# Patient Record
Sex: FEMALE | Race: WHITE | Employment: PART TIME | URBAN - METROPOLITAN AREA
[De-identification: names, ages, dates, MRNs, and addresses within clinical notes are randomized per-mention and may not be internally consistent; named-entity substitution may affect disease eponyms.]

---

## 2017-08-24 PROBLEM — N60.99 BENIGN BREAST DISEASE: Status: ACTIVE | Noted: 2017-08-24

## 2017-08-24 PROBLEM — Z80.0 FAMILY HISTORY OF COLON CANCER IN FATHER: Status: ACTIVE | Noted: 2017-08-24

## 2019-02-01 PROBLEM — IMO0002 CRPS (COMPLEX REGIONAL PAIN SYNDROME): Status: RESOLVED | Noted: 2019-02-01 | Resolved: 2019-02-01

## 2019-02-01 PROBLEM — G47.01 INSOMNIA DUE TO MEDICAL CONDITION: Status: ACTIVE | Noted: 2019-02-01

## 2021-08-30 ENCOUNTER — HOSPITAL ENCOUNTER (OUTPATIENT)
Dept: LAB | Facility: HOSPITAL | Age: 66
Discharge: HOME | End: 2021-08-30
Attending: THORACIC SURGERY (CARDIOTHORACIC VASCULAR SURGERY)
Payer: COMMERCIAL

## 2021-08-30 ENCOUNTER — HOSPITAL ENCOUNTER (OUTPATIENT)
Dept: CARDIOLOGY CLINIC | Facility: HOSPITAL | Age: 66
Discharge: HOME | End: 2021-08-30
Attending: THORACIC SURGERY (CARDIOTHORACIC VASCULAR SURGERY)
Payer: COMMERCIAL

## 2021-08-30 ENCOUNTER — HOSPITAL ENCOUNTER (OUTPATIENT)
Dept: GENERAL RADIOLOGY | Facility: HOSPITAL | Age: 66
Discharge: HOME | End: 2021-08-30
Attending: THORACIC SURGERY (CARDIOTHORACIC VASCULAR SURGERY)
Payer: COMMERCIAL

## 2021-08-30 ENCOUNTER — HOSPITAL ENCOUNTER (OUTPATIENT)
Dept: INTERVENTIONAL RADIOLOGY/VASCULAR | Facility: HOSPITAL | Age: 66
Discharge: HOME | End: 2021-08-30
Attending: THORACIC SURGERY (CARDIOTHORACIC VASCULAR SURGERY)
Payer: COMMERCIAL

## 2021-08-30 ENCOUNTER — HOSPITAL ENCOUNTER (OUTPATIENT)
Dept: CV DIAGNOSTICS | Facility: HOSPITAL | Age: 66
Discharge: HOME | End: 2021-08-30
Attending: THORACIC SURGERY (CARDIOTHORACIC VASCULAR SURGERY)
Payer: COMMERCIAL

## 2021-08-30 DIAGNOSIS — Z01.818 PREOP TESTING: ICD-10-CM

## 2021-08-30 DIAGNOSIS — I25.10 CAD (CORONARY ARTERY DISEASE): ICD-10-CM

## 2021-08-30 DIAGNOSIS — Z91.89 AT RISK FOR BLEEDING: ICD-10-CM

## 2021-08-30 LAB
ALBUMIN SERPL-MCNC: 3.7 G/DL (ref 3.4–5)
ALBUMIN/GLOB SERPL: 1 {RATIO} (ref 1–2)
ALP LIVER SERPL-CCNC: 82 U/L
ALT SERPL-CCNC: 26 U/L
ANION GAP SERPL CALC-SCNC: 3 MMOL/L (ref 0–18)
ANTIBODY SCREEN: NEGATIVE
APTT PPP: 30.3 SECONDS (ref 25.4–36.1)
AST SERPL-CCNC: 23 U/L (ref 15–37)
ATRIAL RATE: 77 BPM
BASOPHILS # BLD AUTO: 0.03 X10(3) UL (ref 0–0.2)
BASOPHILS NFR BLD AUTO: 0.4 %
BILIRUB SERPL-MCNC: 0.4 MG/DL (ref 0.1–2)
BILIRUB UR QL STRIP.AUTO: NEGATIVE
BUN BLD-MCNC: 19 MG/DL (ref 7–18)
CALCIUM BLD-MCNC: 9.8 MG/DL (ref 8.5–10.1)
CHLORIDE SERPL-SCNC: 109 MMOL/L (ref 98–112)
CLARITY UR REFRACT.AUTO: CLEAR
CO2 SERPL-SCNC: 28 MMOL/L (ref 21–32)
COLOR UR AUTO: YELLOW
CREAT BLD-MCNC: 0.88 MG/DL
EOSINOPHIL # BLD AUTO: 0.07 X10(3) UL (ref 0–0.7)
EOSINOPHIL NFR BLD AUTO: 1 %
ERYTHROCYTE [DISTWIDTH] IN BLOOD BY AUTOMATED COUNT: 12 %
EST. AVERAGE GLUCOSE BLD GHB EST-MCNC: 108 MG/DL (ref 68–126)
GLOBULIN PLAS-MCNC: 3.7 G/DL (ref 2.8–4.4)
GLUCOSE BLD-MCNC: 93 MG/DL (ref 70–99)
GLUCOSE UR STRIP.AUTO-MCNC: NEGATIVE MG/DL
HBA1C MFR BLD HPLC: 5.4 % (ref ?–5.7)
HCT VFR BLD AUTO: 40.8 %
HGB BLD-MCNC: 12.9 G/DL
IMM GRANULOCYTES # BLD AUTO: 0.02 X10(3) UL (ref 0–1)
IMM GRANULOCYTES NFR BLD: 0.3 %
INR BLD: 1.25 (ref 0.89–1.11)
KETONES UR STRIP.AUTO-MCNC: NEGATIVE MG/DL
LYMPHOCYTES # BLD AUTO: 2.55 X10(3) UL (ref 1–4)
LYMPHOCYTES NFR BLD AUTO: 35.1 %
M PROTEIN MFR SERPL ELPH: 7.4 G/DL (ref 6.4–8.2)
MCH RBC QN AUTO: 29.7 PG (ref 26–34)
MCHC RBC AUTO-ENTMCNC: 31.6 G/DL (ref 31–37)
MCV RBC AUTO: 93.8 FL
MONOCYTES # BLD AUTO: 0.51 X10(3) UL (ref 0.1–1)
MONOCYTES NFR BLD AUTO: 7 %
NEUTROPHILS # BLD AUTO: 4.09 X10 (3) UL (ref 1.5–7.7)
NEUTROPHILS # BLD AUTO: 4.09 X10(3) UL (ref 1.5–7.7)
NEUTROPHILS NFR BLD AUTO: 56.2 %
NITRITE UR QL STRIP.AUTO: NEGATIVE
OSMOLALITY SERPL CALC.SUM OF ELEC: 292 MOSM/KG (ref 275–295)
P AXIS: 55 DEGREES
P-R INTERVAL: 136 MS
PATIENT FASTING Y/N/NP: NO
PH UR STRIP.AUTO: 6 [PH] (ref 5–8)
PLATELET # BLD AUTO: 224 10(3)UL (ref 150–450)
POTASSIUM SERPL-SCNC: 4.7 MMOL/L (ref 3.5–5.1)
PROT UR STRIP.AUTO-MCNC: NEGATIVE MG/DL
PSA SERPL DL<=0.01 NG/ML-MCNC: 16 SECONDS (ref 12.2–14.5)
Q-T INTERVAL: 336 MS
QRS DURATION: 74 MS
QTC CALCULATION (BEZET): 380 MS
R AXIS: 48 DEGREES
RBC # BLD AUTO: 4.35 X10(6)UL
RBC UR QL AUTO: NEGATIVE
RH BLOOD TYPE: POSITIVE
SODIUM SERPL-SCNC: 140 MMOL/L (ref 136–145)
SP GR UR STRIP.AUTO: 1.01 (ref 1–1.03)
T AXIS: 23 DEGREES
UROBILINOGEN UR STRIP.AUTO-MCNC: <2 MG/DL
VENTRICULAR RATE: 77 BPM
WBC # BLD AUTO: 7.3 X10(3) UL (ref 4–11)

## 2021-08-30 PROCEDURE — 85610 PROTHROMBIN TIME: CPT | Performed by: THORACIC SURGERY (CARDIOTHORACIC VASCULAR SURGERY)

## 2021-08-30 PROCEDURE — 86900 BLOOD TYPING SEROLOGIC ABO: CPT | Performed by: THORACIC SURGERY (CARDIOTHORACIC VASCULAR SURGERY)

## 2021-08-30 PROCEDURE — 86850 RBC ANTIBODY SCREEN: CPT | Performed by: THORACIC SURGERY (CARDIOTHORACIC VASCULAR SURGERY)

## 2021-08-30 PROCEDURE — 93005 ELECTROCARDIOGRAM TRACING: CPT

## 2021-08-30 PROCEDURE — 85025 COMPLETE CBC W/AUTO DIFF WBC: CPT | Performed by: THORACIC SURGERY (CARDIOTHORACIC VASCULAR SURGERY)

## 2021-08-30 PROCEDURE — 83036 HEMOGLOBIN GLYCOSYLATED A1C: CPT | Performed by: THORACIC SURGERY (CARDIOTHORACIC VASCULAR SURGERY)

## 2021-08-30 PROCEDURE — 93010 ELECTROCARDIOGRAM REPORT: CPT | Performed by: INTERNAL MEDICINE

## 2021-08-30 PROCEDURE — 71045 X-RAY EXAM CHEST 1 VIEW: CPT | Performed by: THORACIC SURGERY (CARDIOTHORACIC VASCULAR SURGERY)

## 2021-08-30 PROCEDURE — 80053 COMPREHEN METABOLIC PANEL: CPT | Performed by: THORACIC SURGERY (CARDIOTHORACIC VASCULAR SURGERY)

## 2021-08-30 PROCEDURE — 81001 URINALYSIS AUTO W/SCOPE: CPT | Performed by: THORACIC SURGERY (CARDIOTHORACIC VASCULAR SURGERY)

## 2021-08-30 PROCEDURE — 86901 BLOOD TYPING SEROLOGIC RH(D): CPT | Performed by: THORACIC SURGERY (CARDIOTHORACIC VASCULAR SURGERY)

## 2021-08-30 PROCEDURE — 36415 COLL VENOUS BLD VENIPUNCTURE: CPT | Performed by: THORACIC SURGERY (CARDIOTHORACIC VASCULAR SURGERY)

## 2021-08-30 PROCEDURE — 85730 THROMBOPLASTIN TIME PARTIAL: CPT | Performed by: THORACIC SURGERY (CARDIOTHORACIC VASCULAR SURGERY)

## 2021-08-30 NOTE — PROGRESS NOTES
Met with patient and  in PAT to discuss pre op teaching, post op expectations, discharge planning. Discussed roles of CM/SW, PT/OT, Cardiac rehab in education and recovery. All questions answered, binder given.   Pt and  are retired,

## 2021-08-30 NOTE — CM/SW NOTE
Met with patient, accompanied by spouse janelle to discuss discharge planning as patient is scheduled 9/8/21 for CABG with dr Stevie Samuel. 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   Patient and spouse reside in a [facility]

## 2021-08-30 NOTE — PAT NURSING NOTE
Met with patient and  in structural heart regarding pre op instructions for CABG scheduled on 9/8/21. Instructed time of arrival 0530, park in [facility] garage/register at [facility] on ground floor. NPO 2200, no medications AM of surgery.  BRIDGET

## 2021-09-06 ENCOUNTER — LAB ENCOUNTER (OUTPATIENT)
Dept: LAB | Facility: HOSPITAL | Age: 66
End: 2021-09-06
Attending: THORACIC SURGERY (CARDIOTHORACIC VASCULAR SURGERY)
Payer: COMMERCIAL

## 2021-09-06 DIAGNOSIS — Z01.818 PREOP TESTING: ICD-10-CM

## 2021-09-06 DIAGNOSIS — I25.10 CAD (CORONARY ARTERY DISEASE): ICD-10-CM

## 2021-09-06 DIAGNOSIS — Z91.89 AT RISK FOR BLEEDING: ICD-10-CM

## 2021-09-07 LAB — SARS-COV-2 RNA RESP QL NAA+PROBE: NOT DETECTED

## 2021-09-08 ENCOUNTER — ANESTHESIA EVENT (OUTPATIENT)
Dept: CARDIAC SURGERY | Facility: HOSPITAL | Age: 66
DRG: 236 | End: 2021-09-08
Payer: COMMERCIAL

## 2021-09-08 ENCOUNTER — ANESTHESIA (OUTPATIENT)
Dept: CARDIAC SURGERY | Facility: HOSPITAL | Age: 66
DRG: 236 | End: 2021-09-08
Payer: COMMERCIAL

## 2021-09-08 ENCOUNTER — HOSPITAL ENCOUNTER (INPATIENT)
Facility: HOSPITAL | Age: 66
LOS: 6 days | Discharge: HOME HEALTH | DRG: 236 | End: 2021-09-14
Attending: THORACIC SURGERY (CARDIOTHORACIC VASCULAR SURGERY) | Admitting: THORACIC SURGERY (CARDIOTHORACIC VASCULAR SURGERY)
Payer: COMMERCIAL

## 2021-09-08 ENCOUNTER — APPOINTMENT (OUTPATIENT)
Dept: GENERAL RADIOLOGY | Facility: HOSPITAL | Age: 66
DRG: 236 | End: 2021-09-08
Attending: PHYSICIAN ASSISTANT
Payer: COMMERCIAL

## 2021-09-08 DIAGNOSIS — Z91.89 AT RISK FOR BLEEDING: ICD-10-CM

## 2021-09-08 DIAGNOSIS — Z01.818 PREOP TESTING: ICD-10-CM

## 2021-09-08 DIAGNOSIS — I25.10 CAD (CORONARY ARTERY DISEASE): Primary | ICD-10-CM

## 2021-09-08 DIAGNOSIS — J90 PLEURAL EFFUSION: ICD-10-CM

## 2021-09-08 LAB
APTT PPP: 27.6 SECONDS (ref 25.4–36.1)
APTT PPP: 28.1 SECONDS (ref 25.4–36.1)
BASE EXCESS BLD CALC-SCNC: -2 MMOL/L
BASE EXCESS BLD CALC-SCNC: -4 MMOL/L
BASE EXCESS BLDA CALC-SCNC: -1 MMOL/L (ref ?–30)
BASE EXCESS BLDA CALC-SCNC: -4.1 MMOL/L (ref ?–2)
BASE EXCESS BLDA CALC-SCNC: 2 MMOL/L (ref ?–30)
BASE EXCESS BLDMV CALC-SCNC: -3 MMOL/L (ref ?–30)
BASE EXCESS BLDMV CALC-SCNC: 4 MMOL/L (ref ?–30)
BODY TEMPERATURE: 99.4 F
BUN BLD-MCNC: 13 MG/DL (ref 7–18)
CA-I BLD-SCNC: 1.2 MMOL/L (ref 1.12–1.32)
CA-I BLD-SCNC: 1.21 MMOL/L (ref 1.12–1.32)
CA-I BLD-SCNC: 1.23 MMOL/L (ref 1.12–1.32)
CA-I BLDA-SCNC: 1.3 MMOL/L (ref 1.12–1.32)
CA-I BLDA-SCNC: 1.33 MMOL/L (ref 1.12–1.32)
CA-I BLDMV-SCNC: 1.06 MMOL/L (ref 1.12–1.32)
CA-I BLDMV-SCNC: 1.1 MMOL/L (ref 1.12–1.32)
CALCIUM BLD-MCNC: 8.3 MG/DL (ref 8.5–10.1)
CHLORIDE SERPL-SCNC: 115 MMOL/L (ref 98–112)
CO2 BLD-SCNC: 23 MMOL/L (ref 22–32)
CO2 BLD-SCNC: 23 MMOL/L (ref 22–32)
CO2 BLDA-SCNC: 24 MMOL/L (ref 22–32)
CO2 BLDA-SCNC: 28 MMOL/L (ref 22–32)
CO2 BLDMV-SCNC: 24 MMOL/L (ref 22–32)
CO2 BLDMV-SCNC: 29 MMOL/L (ref 22–32)
CO2 SERPL-SCNC: 22 MMOL/L (ref 21–32)
COHGB MFR BLD: 1 % SAT (ref 0–3)
CREAT BLD-MCNC: 0.54 MG/DL
ERYTHROCYTE [DISTWIDTH] IN BLOOD BY AUTOMATED COUNT: 12.3 %
FIBRINOGEN: 114 MG/DL (ref 200–446)
FIBRINOGEN: 95 MG/DL (ref 200–446)
FIO2: 40 %
GLUCOSE BLD-MCNC: 105 MG/DL (ref 70–99)
GLUCOSE BLD-MCNC: 108 MG/DL (ref 70–99)
GLUCOSE BLD-MCNC: 112 MG/DL (ref 70–99)
GLUCOSE BLD-MCNC: 112 MG/DL (ref 70–99)
GLUCOSE BLD-MCNC: 114 MG/DL (ref 70–99)
GLUCOSE BLD-MCNC: 119 MG/DL (ref 70–99)
GLUCOSE BLD-MCNC: 119 MG/DL (ref 70–99)
GLUCOSE BLD-MCNC: 121 MG/DL (ref 70–99)
GLUCOSE BLD-MCNC: 124 MG/DL (ref 70–99)
GLUCOSE BLD-MCNC: 126 MG/DL (ref 70–99)
GLUCOSE BLD-MCNC: 132 MG/DL (ref 70–99)
GLUCOSE BLD-MCNC: 134 MG/DL (ref 70–99)
GLUCOSE BLD-MCNC: 138 MG/DL (ref 70–99)
GLUCOSE BLD-MCNC: 140 MG/DL (ref 70–99)
GLUCOSE BLD-MCNC: 141 MG/DL (ref 70–99)
GLUCOSE BLD-MCNC: 179 MG/DL (ref 70–99)
GLUCOSE BLD-MCNC: 93 MG/DL (ref 70–99)
GLUCOSE BLD-MCNC: 96 MG/DL (ref 70–99)
GLUCOSE BLDA-MCNC: 127 MG/DL (ref 70–99)
HCO3 BLD-SCNC: 22.1 MEQ/L
HCO3 BLD-SCNC: 22.2 MEQ/L
HCO3 BLDA-SCNC: 19.8 MEQ/L (ref 22–26)
HCO3 BLDA-SCNC: 22.8 MEQ/L (ref 22–26)
HCO3 BLDA-SCNC: 26.5 MEQ/L (ref 22–26)
HCO3 BLDMV-SCNC: 22.7 MEQ/L (ref 22–26)
HCO3 BLDMV-SCNC: 27.4 MEQ/L (ref 22–26)
HCT VFR BLD AUTO: 31.4 %
HCT VFR BLD CALC: 29 %
HCT VFR BLD CALC: 32 %
HCT VFR BLDA CALC: 20 %
HCT VFR BLDA CALC: 27 %
HCT VFR BLDMV CALC: 17 %
HCT VFR BLDMV CALC: 19 %
HGB BLD-MCNC: 10.2 G/DL
HGB BLD-MCNC: 11.4 G/DL
INR BLD: 1.5 (ref 0.89–1.11)
INR BLD: 1.74 (ref 0.89–1.11)
ISTAT ACTIVATED CLOTTING TIME: 103 SECONDS (ref 74–137)
ISTAT ACTIVATED CLOTTING TIME: 103 SECONDS (ref 74–137)
ISTAT ACTIVATED CLOTTING TIME: 109 SECONDS (ref 74–137)
ISTAT ACTIVATED CLOTTING TIME: 362 SECONDS (ref 74–137)
ISTAT ACTIVATED CLOTTING TIME: 389 SECONDS (ref 74–137)
ISTAT ACTIVATED CLOTTING TIME: 428 SECONDS (ref 74–137)
ISTAT BLOOD GAS FIO2: 60 %
ISTAT PATIENT TEMPERATURE: 32 DEGREE
LACTATE BLDA-SCNC: 2.1 MMOL/L (ref 0.5–2)
MAGNESIUM SERPL-MCNC: 2.7 MG/DL (ref 1.6–2.6)
MCH RBC QN AUTO: 29.4 PG (ref 26–34)
MCHC RBC AUTO-ENTMCNC: 32.5 G/DL (ref 31–37)
MCV RBC AUTO: 90.5 FL
METHGB MFR BLD: 0.6 % SAT (ref 0.4–1.5)
P/F RATIO: 407.9 MMHG
PCO2 BLD: 34.8 MMHG
PCO2 BLD: 44.9 MMHG
PCO2 BLDA: 33 MM HG (ref 35–45)
PCO2 BLDA: 33.2 MMHG (ref 35–45)
PCO2 BLDA: 38.8 MMHG (ref 35–45)
PCO2 BLDMV: 32.1 MMHG (ref 38–50)
PCO2 BLDMV: 36.4 MMHG (ref 38–50)
PEEP: 5 CM H2O
PH BLD: 7.3 [PH]
PH BLD: 7.41 [PH]
PH BLDA: 7.4 [PH] (ref 7.35–7.45)
PH BLDA: 7.44 [PH] (ref 7.35–7.45)
PH BLDA: 7.44 [PH] (ref 7.35–7.45)
PH BLDMV: 7.44 [PH] (ref 7.32–7.43)
PH BLDMV: 7.48 [PH] (ref 7.32–7.43)
PLATELET # BLD AUTO: 105 10(3)UL (ref 150–450)
PLATELET # BLD AUTO: 139 10(3)UL (ref 150–450)
PO2 BLD: 212 MMHG
PO2 BLD: 249 MMHG
PO2 BLDA: 166 MM HG (ref 80–105)
PO2 BLDA: 226 MMHG (ref 80–105)
PO2 BLDA: 258 MMHG (ref 80–105)
PO2 BLDMV: <70 MMHG (ref 35–40)
PO2 BLDMV: <70 MMHG (ref 35–40)
POTASSIUM BLD-SCNC: 3.6 MMOL/L (ref 3.6–5.1)
POTASSIUM BLD-SCNC: 3.7 MMOL/L (ref 3.6–5.1)
POTASSIUM BLD-SCNC: 3.9 MMOL/L (ref 3.6–5.1)
POTASSIUM SERPL-SCNC: 4.4 MMOL/L (ref 3.5–5.1)
PRESSURE SUPPORT: 5 CM H2O
PSA SERPL DL<=0.01 NG/ML-MCNC: 18.4 SECONDS (ref 12.2–14.5)
PSA SERPL DL<=0.01 NG/ML-MCNC: 20.7 SECONDS (ref 12.2–14.5)
RBC # BLD AUTO: 3.47 X10(6)UL
SAO2 % BLD: 100 %
SAO2 % BLD: 100 %
SAO2 % BLDA FROM PO2: 99 % (ref 92–100)
SAO2 % BLDA: 100 % (ref 92–100)
SAO2 % BLDA: 100 % (ref 92–100)
SAO2 % BLDA: 98 % (ref 92–100)
SAO2 % BLDMV: 78 % (ref 60–85)
SAO2 % BLDMV: 87 % (ref 60–85)
SODIUM BLD-SCNC: 141 MMOL/L (ref 136–144)
SODIUM BLD-SCNC: 141 MMOL/L (ref 136–145)
SODIUM BLD-SCNC: 141 MMOL/L (ref 136–145)
SODIUM BLDA-SCNC: 138 MMOL/L (ref 136–145)
SODIUM BLDA-SCNC: 142 MMOL/L (ref 136–145)
SODIUM BLDA-SCNC: 4.3 MMOL/L (ref 3.6–5.1)
SODIUM BLDA-SCNC: 5.2 MMOL/L (ref 3.6–5.1)
SODIUM BLDMV-SCNC: 136 MMOL/L (ref 136–145)
SODIUM BLDMV-SCNC: 140 MMOL/L (ref 136–145)
SODIUM BLDMV-SCNC: 6.3 MMOL/L (ref 3.6–5.1)
SODIUM BLDMV-SCNC: 6.6 MMOL/L (ref 3.6–5.1)
SODIUM SERPL-SCNC: 141 MMOL/L (ref 136–145)
WBC # BLD AUTO: 12.4 X10(3) UL (ref 4–11)

## 2021-09-08 PROCEDURE — 85610 PROTHROMBIN TIME: CPT | Performed by: PHYSICIAN ASSISTANT

## 2021-09-08 PROCEDURE — 021009W BYPASS CORONARY ARTERY, ONE ARTERY FROM AORTA WITH AUTOLOGOUS VENOUS TISSUE, OPEN APPROACH: ICD-10-PCS | Performed by: THORACIC SURGERY (CARDIOTHORACIC VASCULAR SURGERY)

## 2021-09-08 PROCEDURE — 85347 COAGULATION TIME ACTIVATED: CPT

## 2021-09-08 PROCEDURE — 82803 BLOOD GASES ANY COMBINATION: CPT

## 2021-09-08 PROCEDURE — 71045 X-RAY EXAM CHEST 1 VIEW: CPT | Performed by: PHYSICIAN ASSISTANT

## 2021-09-08 PROCEDURE — 85014 HEMATOCRIT: CPT

## 2021-09-08 PROCEDURE — 85018 HEMOGLOBIN: CPT | Performed by: ANESTHESIOLOGY

## 2021-09-08 PROCEDURE — 82330 ASSAY OF CALCIUM: CPT | Performed by: ANESTHESIOLOGY

## 2021-09-08 PROCEDURE — 85384 FIBRINOGEN ACTIVITY: CPT | Performed by: PHYSICIAN ASSISTANT

## 2021-09-08 PROCEDURE — 76942 ECHO GUIDE FOR BIOPSY: CPT | Performed by: ANESTHESIOLOGY

## 2021-09-08 PROCEDURE — 82375 ASSAY CARBOXYHB QUANT: CPT | Performed by: ANESTHESIOLOGY

## 2021-09-08 PROCEDURE — 85049 AUTOMATED PLATELET COUNT: CPT | Performed by: THORACIC SURGERY (CARDIOTHORACIC VASCULAR SURGERY)

## 2021-09-08 PROCEDURE — 82330 ASSAY OF CALCIUM: CPT

## 2021-09-08 PROCEDURE — C9113 INJ PANTOPRAZOLE SODIUM, VIA: HCPCS | Performed by: PHYSICIAN ASSISTANT

## 2021-09-08 PROCEDURE — 85610 PROTHROMBIN TIME: CPT | Performed by: THORACIC SURGERY (CARDIOTHORACIC VASCULAR SURGERY)

## 2021-09-08 PROCEDURE — 85027 COMPLETE CBC AUTOMATED: CPT | Performed by: PHYSICIAN ASSISTANT

## 2021-09-08 PROCEDURE — 84295 ASSAY OF SERUM SODIUM: CPT

## 2021-09-08 PROCEDURE — 5A1221Z PERFORMANCE OF CARDIAC OUTPUT, CONTINUOUS: ICD-10-PCS | Performed by: THORACIC SURGERY (CARDIOTHORACIC VASCULAR SURGERY)

## 2021-09-08 PROCEDURE — 94150 VITAL CAPACITY TEST: CPT

## 2021-09-08 PROCEDURE — 85384 FIBRINOGEN ACTIVITY: CPT | Performed by: THORACIC SURGERY (CARDIOTHORACIC VASCULAR SURGERY)

## 2021-09-08 PROCEDURE — 84132 ASSAY OF SERUM POTASSIUM: CPT

## 2021-09-08 PROCEDURE — 83735 ASSAY OF MAGNESIUM: CPT | Performed by: PHYSICIAN ASSISTANT

## 2021-09-08 PROCEDURE — 83050 HGB METHEMOGLOBIN QUAN: CPT | Performed by: ANESTHESIOLOGY

## 2021-09-08 PROCEDURE — 83605 ASSAY OF LACTIC ACID: CPT | Performed by: ANESTHESIOLOGY

## 2021-09-08 PROCEDURE — 84295 ASSAY OF SERUM SODIUM: CPT | Performed by: ANESTHESIOLOGY

## 2021-09-08 PROCEDURE — S0028 INJECTION, FAMOTIDINE, 20 MG: HCPCS | Performed by: ANESTHESIOLOGY

## 2021-09-08 PROCEDURE — 80048 BASIC METABOLIC PNL TOTAL CA: CPT | Performed by: PHYSICIAN ASSISTANT

## 2021-09-08 PROCEDURE — 82962 GLUCOSE BLOOD TEST: CPT

## 2021-09-08 PROCEDURE — 93010 ELECTROCARDIOGRAM REPORT: CPT | Performed by: INTERNAL MEDICINE

## 2021-09-08 PROCEDURE — P9047 ALBUMIN (HUMAN), 25%, 50ML: HCPCS

## 2021-09-08 PROCEDURE — 82803 BLOOD GASES ANY COMBINATION: CPT | Performed by: ANESTHESIOLOGY

## 2021-09-08 PROCEDURE — 94002 VENT MGMT INPAT INIT DAY: CPT

## 2021-09-08 PROCEDURE — 93005 ELECTROCARDIOGRAM TRACING: CPT

## 2021-09-08 PROCEDURE — 85730 THROMBOPLASTIN TIME PARTIAL: CPT | Performed by: THORACIC SURGERY (CARDIOTHORACIC VASCULAR SURGERY)

## 2021-09-08 PROCEDURE — 06BQ4ZZ EXCISION OF LEFT SAPHENOUS VEIN, PERCUTANEOUS ENDOSCOPIC APPROACH: ICD-10-PCS | Performed by: THORACIC SURGERY (CARDIOTHORACIC VASCULAR SURGERY)

## 2021-09-08 PROCEDURE — 84132 ASSAY OF SERUM POTASSIUM: CPT | Performed by: ANESTHESIOLOGY

## 2021-09-08 PROCEDURE — 93312 ECHO TRANSESOPHAGEAL: CPT | Performed by: ANESTHESIOLOGY

## 2021-09-08 PROCEDURE — 86920 COMPATIBILITY TEST SPIN: CPT

## 2021-09-08 PROCEDURE — 85730 THROMBOPLASTIN TIME PARTIAL: CPT | Performed by: PHYSICIAN ASSISTANT

## 2021-09-08 DEVICE — IMPLANTABLE DEVICE
Type: IMPLANTABLE DEVICE | Status: FUNCTIONAL
Brand: STERNALOCK® BLU SYSTEM

## 2021-09-08 RX ORDER — POTASSIUM CHLORIDE 29.8 MG/ML
40 INJECTION INTRAVENOUS AS NEEDED
Status: DISCONTINUED | OUTPATIENT
Start: 2021-09-08 | End: 2021-09-13 | Stop reason: HOSPADM

## 2021-09-08 RX ORDER — SODIUM CHLORIDE 9 MG/ML
INJECTION, SOLUTION INTRAVENOUS CONTINUOUS PRN
Status: DISCONTINUED | OUTPATIENT
Start: 2021-09-08 | End: 2021-09-08 | Stop reason: SURG

## 2021-09-08 RX ORDER — IPRATROPIUM BROMIDE AND ALBUTEROL SULFATE 2.5; .5 MG/3ML; MG/3ML
3 SOLUTION RESPIRATORY (INHALATION) EVERY 4 HOURS PRN
Status: DISCONTINUED | OUTPATIENT
Start: 2021-09-08 | End: 2021-09-10

## 2021-09-08 RX ORDER — DEXTROSE AND SODIUM CHLORIDE 5; .45 G/100ML; G/100ML
INJECTION, SOLUTION INTRAVENOUS CONTINUOUS
Status: DISCONTINUED | OUTPATIENT
Start: 2021-09-08 | End: 2021-09-10

## 2021-09-08 RX ORDER — ONDANSETRON 2 MG/ML
4 INJECTION INTRAMUSCULAR; INTRAVENOUS EVERY 6 HOURS PRN
Status: DISCONTINUED | OUTPATIENT
Start: 2021-09-08 | End: 2021-09-14

## 2021-09-08 RX ORDER — MORPHINE SULFATE 4 MG/ML
4 INJECTION, SOLUTION INTRAMUSCULAR; INTRAVENOUS EVERY 2 HOUR PRN
Status: DISCONTINUED | OUTPATIENT
Start: 2021-09-08 | End: 2021-09-14

## 2021-09-08 RX ORDER — DEXMEDETOMIDINE HYDROCHLORIDE 4 UG/ML
INJECTION, SOLUTION INTRAVENOUS CONTINUOUS
Status: DISCONTINUED | OUTPATIENT
Start: 2021-09-08 | End: 2021-09-10

## 2021-09-08 RX ORDER — POLYETHYLENE GLYCOL 3350 17 G/17G
17 POWDER, FOR SOLUTION ORAL DAILY PRN
Status: DISCONTINUED | OUTPATIENT
Start: 2021-09-08 | End: 2021-09-14

## 2021-09-08 RX ORDER — SCOLOPAMINE TRANSDERMAL SYSTEM 1 MG/1
1 PATCH, EXTENDED RELEASE TRANSDERMAL
Status: DISCONTINUED | OUTPATIENT
Start: 2021-09-08 | End: 2021-09-14

## 2021-09-08 RX ORDER — MORPHINE SULFATE 2 MG/ML
2 INJECTION, SOLUTION INTRAMUSCULAR; INTRAVENOUS EVERY 2 HOUR PRN
Status: DISCONTINUED | OUTPATIENT
Start: 2021-09-08 | End: 2021-09-14

## 2021-09-08 RX ORDER — PROTAMINE SULFATE 10 MG/ML
INJECTION, SOLUTION INTRAVENOUS AS NEEDED
Status: DISCONTINUED | OUTPATIENT
Start: 2021-09-08 | End: 2021-09-08 | Stop reason: SURG

## 2021-09-08 RX ORDER — DOBUTAMINE HYDROCHLORIDE 200 MG/100ML
INJECTION INTRAVENOUS CONTINUOUS PRN
Status: DISCONTINUED | OUTPATIENT
Start: 2021-09-08 | End: 2021-09-14

## 2021-09-08 RX ORDER — NITROGLYCERIN 20 MG/100ML
INJECTION INTRAVENOUS CONTINUOUS PRN
Status: DISCONTINUED | OUTPATIENT
Start: 2021-09-08 | End: 2021-09-08 | Stop reason: SURG

## 2021-09-08 RX ORDER — DEXTROSE MONOHYDRATE 25 G/50ML
50 INJECTION, SOLUTION INTRAVENOUS
Status: DISCONTINUED | OUTPATIENT
Start: 2021-09-08 | End: 2021-09-08

## 2021-09-08 RX ORDER — VANCOMYCIN HYDROCHLORIDE 1 G/20ML
INJECTION, POWDER, LYOPHILIZED, FOR SOLUTION INTRAVENOUS AS NEEDED
Status: DISCONTINUED | OUTPATIENT
Start: 2021-09-08 | End: 2021-09-08 | Stop reason: SURG

## 2021-09-08 RX ORDER — MAGNESIUM SULFATE 1 G/100ML
1 INJECTION INTRAVENOUS AS NEEDED
Status: DISCONTINUED | OUTPATIENT
Start: 2021-09-08 | End: 2021-09-13 | Stop reason: HOSPADM

## 2021-09-08 RX ORDER — MIDAZOLAM HYDROCHLORIDE 1 MG/ML
1 INJECTION INTRAMUSCULAR; INTRAVENOUS EVERY 30 MIN PRN
Status: DISCONTINUED | OUTPATIENT
Start: 2021-09-08 | End: 2021-09-10

## 2021-09-08 RX ORDER — HEPARIN SODIUM 1000 [USP'U]/ML
INJECTION, SOLUTION INTRAVENOUS; SUBCUTANEOUS AS NEEDED
Status: DISCONTINUED | OUTPATIENT
Start: 2021-09-08 | End: 2021-09-08 | Stop reason: SURG

## 2021-09-08 RX ORDER — DIPHENHYDRAMINE HYDROCHLORIDE 50 MG/ML
INJECTION INTRAMUSCULAR; INTRAVENOUS AS NEEDED
Status: DISCONTINUED | OUTPATIENT
Start: 2021-09-08 | End: 2021-09-08 | Stop reason: SURG

## 2021-09-08 RX ORDER — MAGNESIUM SULFATE HEPTAHYDRATE 40 MG/ML
2 INJECTION, SOLUTION INTRAVENOUS AS NEEDED
Status: DISCONTINUED | OUTPATIENT
Start: 2021-09-08 | End: 2021-09-13 | Stop reason: HOSPADM

## 2021-09-08 RX ORDER — CHLORHEXIDINE GLUCONATE 0.12 MG/ML
15 RINSE ORAL
Status: DISCONTINUED | OUTPATIENT
Start: 2021-09-08 | End: 2021-09-10

## 2021-09-08 RX ORDER — CEFAZOLIN SODIUM/WATER 2 G/20 ML
2 SYRINGE (ML) INTRAVENOUS EVERY 8 HOURS
Status: COMPLETED | OUTPATIENT
Start: 2021-09-08 | End: 2021-09-09

## 2021-09-08 RX ORDER — CEFAZOLIN SODIUM/WATER 2 G/20 ML
SYRINGE (ML) INTRAVENOUS AS NEEDED
Status: DISCONTINUED | OUTPATIENT
Start: 2021-09-08 | End: 2021-09-08 | Stop reason: SURG

## 2021-09-08 RX ORDER — MIDAZOLAM HYDROCHLORIDE 1 MG/ML
INJECTION INTRAMUSCULAR; INTRAVENOUS AS NEEDED
Status: DISCONTINUED | OUTPATIENT
Start: 2021-09-08 | End: 2021-09-08 | Stop reason: SURG

## 2021-09-08 RX ORDER — MELATONIN
3 NIGHTLY PRN
Status: DISCONTINUED | OUTPATIENT
Start: 2021-09-08 | End: 2021-09-14

## 2021-09-08 RX ORDER — ALBUMIN, HUMAN INJ 5% 5 %
250 SOLUTION INTRAVENOUS ONCE AS NEEDED
Status: DISCONTINUED | OUTPATIENT
Start: 2021-09-08 | End: 2021-09-14

## 2021-09-08 RX ORDER — DOBUTAMINE HYDROCHLORIDE 200 MG/100ML
INJECTION INTRAVENOUS CONTINUOUS PRN
Status: DISCONTINUED | OUTPATIENT
Start: 2021-09-08 | End: 2021-09-08 | Stop reason: SURG

## 2021-09-08 RX ORDER — PANTOPRAZOLE SODIUM 40 MG/1
40 TABLET, DELAYED RELEASE ORAL
Status: DISCONTINUED | OUTPATIENT
Start: 2021-09-08 | End: 2021-09-14

## 2021-09-08 RX ORDER — SODIUM CHLORIDE 9 MG/ML
INJECTION, SOLUTION INTRAVENOUS CONTINUOUS
Status: DISCONTINUED | OUTPATIENT
Start: 2021-09-08 | End: 2021-09-12

## 2021-09-08 RX ORDER — SODIUM PHOSPHATE, DIBASIC AND SODIUM PHOSPHATE, MONOBASIC 7; 19 G/133ML; G/133ML
1 ENEMA RECTAL ONCE AS NEEDED
Status: DISCONTINUED | OUTPATIENT
Start: 2021-09-08 | End: 2021-09-14

## 2021-09-08 RX ORDER — DEXMEDETOMIDINE HYDROCHLORIDE 4 UG/ML
INJECTION, SOLUTION INTRAVENOUS CONTINUOUS PRN
Status: DISCONTINUED | OUTPATIENT
Start: 2021-09-08 | End: 2021-09-08 | Stop reason: SURG

## 2021-09-08 RX ORDER — POTASSIUM CHLORIDE 14.9 MG/ML
20 INJECTION INTRAVENOUS AS NEEDED
Status: DISCONTINUED | OUTPATIENT
Start: 2021-09-08 | End: 2021-09-13 | Stop reason: HOSPADM

## 2021-09-08 RX ORDER — ACETAMINOPHEN 10 MG/ML
INJECTION, SOLUTION INTRAVENOUS AS NEEDED
Status: DISCONTINUED | OUTPATIENT
Start: 2021-09-08 | End: 2021-09-08 | Stop reason: SURG

## 2021-09-08 RX ORDER — LIDOCAINE HYDROCHLORIDE 10 MG/ML
INJECTION, SOLUTION EPIDURAL; INFILTRATION; INTRACAUDAL; PERINEURAL AS NEEDED
Status: DISCONTINUED | OUTPATIENT
Start: 2021-09-08 | End: 2021-09-08 | Stop reason: SURG

## 2021-09-08 RX ORDER — ACETAMINOPHEN 10 MG/ML
1000 INJECTION, SOLUTION INTRAVENOUS EVERY 6 HOURS
Status: COMPLETED | OUTPATIENT
Start: 2021-09-08 | End: 2021-09-09

## 2021-09-08 RX ORDER — METHYLPREDNISOLONE SODIUM SUCCINATE 500 MG/1
INJECTION, POWDER, FOR SOLUTION INTRAMUSCULAR; INTRAVENOUS AS NEEDED
Status: DISCONTINUED | OUTPATIENT
Start: 2021-09-08 | End: 2021-09-08 | Stop reason: SURG

## 2021-09-08 RX ORDER — FAMOTIDINE 10 MG/ML
INJECTION, SOLUTION INTRAVENOUS AS NEEDED
Status: DISCONTINUED | OUTPATIENT
Start: 2021-09-08 | End: 2021-09-08 | Stop reason: SURG

## 2021-09-08 RX ORDER — DEXTROSE MONOHYDRATE 25 G/50ML
50 INJECTION, SOLUTION INTRAVENOUS
Status: DISCONTINUED | OUTPATIENT
Start: 2021-09-08 | End: 2021-09-12

## 2021-09-08 RX ORDER — ROCURONIUM BROMIDE 10 MG/ML
INJECTION, SOLUTION INTRAVENOUS AS NEEDED
Status: DISCONTINUED | OUTPATIENT
Start: 2021-09-08 | End: 2021-09-08 | Stop reason: SURG

## 2021-09-08 RX ORDER — DOCUSATE SODIUM 100 MG/1
100 CAPSULE, LIQUID FILLED ORAL 2 TIMES DAILY
Status: DISCONTINUED | OUTPATIENT
Start: 2021-09-08 | End: 2021-09-14

## 2021-09-08 RX ORDER — MORPHINE SULFATE 4 MG/ML
6 INJECTION, SOLUTION INTRAMUSCULAR; INTRAVENOUS EVERY 2 HOUR PRN
Status: DISCONTINUED | OUTPATIENT
Start: 2021-09-08 | End: 2021-09-14

## 2021-09-08 RX ORDER — NITROGLYCERIN 20 MG/100ML
INJECTION INTRAVENOUS CONTINUOUS PRN
Status: DISCONTINUED | OUTPATIENT
Start: 2021-09-08 | End: 2021-09-12

## 2021-09-08 RX ORDER — BISACODYL 10 MG
10 SUPPOSITORY, RECTAL RECTAL
Status: DISCONTINUED | OUTPATIENT
Start: 2021-09-08 | End: 2021-09-14

## 2021-09-08 RX ORDER — ATORVASTATIN CALCIUM 10 MG/1
10 TABLET, FILM COATED ORAL NIGHTLY
Status: DISCONTINUED | OUTPATIENT
Start: 2021-09-08 | End: 2021-09-14

## 2021-09-08 RX ADMIN — HEPARIN SODIUM 23000 UNITS: 1000 INJECTION, SOLUTION INTRAVENOUS; SUBCUTANEOUS at 08:18:00

## 2021-09-08 RX ADMIN — VANCOMYCIN HYDROCHLORIDE 1000 MG: 1 INJECTION, POWDER, LYOPHILIZED, FOR SOLUTION INTRAVENOUS at 07:20:00

## 2021-09-08 RX ADMIN — FAMOTIDINE 20 MG: 10 INJECTION, SOLUTION INTRAVENOUS at 06:39:00

## 2021-09-08 RX ADMIN — SODIUM CHLORIDE: 9 INJECTION, SOLUTION INTRAVENOUS at 10:22:00

## 2021-09-08 RX ADMIN — MIDAZOLAM HYDROCHLORIDE 5 MG: 1 INJECTION INTRAMUSCULAR; INTRAVENOUS at 06:41:00

## 2021-09-08 RX ADMIN — METHYLPREDNISOLONE SODIUM SUCCINATE 500 MG: 500 INJECTION, POWDER, FOR SOLUTION INTRAMUSCULAR; INTRAVENOUS at 08:00:00

## 2021-09-08 RX ADMIN — CEFAZOLIN SODIUM/WATER 2 G: 2 G/20 ML SYRINGE (ML) INTRAVENOUS at 06:50:00

## 2021-09-08 RX ADMIN — MIDAZOLAM HYDROCHLORIDE 2 MG: 1 INJECTION INTRAMUSCULAR; INTRAVENOUS at 08:24:00

## 2021-09-08 RX ADMIN — ACETAMINOPHEN 1000 MG: 10 INJECTION, SOLUTION INTRAVENOUS at 09:20:00

## 2021-09-08 RX ADMIN — NITROGLYCERIN 15 MCG/MIN: 20 INJECTION INTRAVENOUS at 10:26:00

## 2021-09-08 RX ADMIN — DEXMEDETOMIDINE HYDROCHLORIDE 0.8 MCG/KG/HR: 4 INJECTION, SOLUTION INTRAVENOUS at 08:25:00

## 2021-09-08 RX ADMIN — LIDOCAINE HYDROCHLORIDE 80 MG: 10 INJECTION, SOLUTION EPIDURAL; INFILTRATION; INTRACAUDAL; PERINEURAL at 06:45:00

## 2021-09-08 RX ADMIN — PROTAMINE SULFATE 10 MG: 10 INJECTION, SOLUTION INTRAVENOUS at 09:09:00

## 2021-09-08 RX ADMIN — DOBUTAMINE HYDROCHLORIDE 5 MCG/KG/MIN: 200 INJECTION INTRAVENOUS at 09:04:00

## 2021-09-08 RX ADMIN — DOBUTAMINE HYDROCHLORIDE 3 MCG/KG/MIN: 200 INJECTION INTRAVENOUS at 09:00:00

## 2021-09-08 RX ADMIN — DOBUTAMINE HYDROCHLORIDE 3 MCG/KG/MIN: 200 INJECTION INTRAVENOUS at 09:09:00

## 2021-09-08 RX ADMIN — ROCURONIUM BROMIDE 70 MG: 10 INJECTION, SOLUTION INTRAVENOUS at 06:45:00

## 2021-09-08 RX ADMIN — DOBUTAMINE HYDROCHLORIDE 3 MCG/KG/MIN: 200 INJECTION INTRAVENOUS at 07:30:00

## 2021-09-08 RX ADMIN — DIPHENHYDRAMINE HYDROCHLORIDE 50 MG: 50 INJECTION INTRAMUSCULAR; INTRAVENOUS at 06:39:00

## 2021-09-08 RX ADMIN — ROCURONIUM BROMIDE 30 MG: 10 INJECTION, SOLUTION INTRAVENOUS at 08:55:00

## 2021-09-08 RX ADMIN — PROTAMINE SULFATE 310 MG: 10 INJECTION, SOLUTION INTRAVENOUS at 09:10:00

## 2021-09-08 RX ADMIN — MIDAZOLAM HYDROCHLORIDE 3 MG: 1 INJECTION INTRAMUSCULAR; INTRAVENOUS at 08:55:00

## 2021-09-08 RX ADMIN — ROCURONIUM BROMIDE 30 MG: 10 INJECTION, SOLUTION INTRAVENOUS at 08:24:00

## 2021-09-08 RX ADMIN — SODIUM CHLORIDE: 9 INJECTION, SOLUTION INTRAVENOUS at 08:28:00

## 2021-09-08 RX ADMIN — ROCURONIUM BROMIDE 30 MG: 10 INJECTION, SOLUTION INTRAVENOUS at 07:25:00

## 2021-09-08 RX ADMIN — SODIUM CHLORIDE: 9 INJECTION, SOLUTION INTRAVENOUS at 06:36:00

## 2021-09-08 RX ADMIN — SODIUM CHLORIDE: 9 INJECTION, SOLUTION INTRAVENOUS at 09:25:00

## 2021-09-08 RX ADMIN — ROCURONIUM BROMIDE 20 MG: 10 INJECTION, SOLUTION INTRAVENOUS at 07:53:00

## 2021-09-08 NOTE — ANESTHESIA PROCEDURE NOTES
Central Line  Performed by: Deena Hernandez MD  Authorized by: Deena Hernandez MD     General Information and Staff    Procedure Start:  9/8/2021 6:53 AM  Procedure End:  9/8/2021 7:04 AM  Anesthesiologist:  Deena Hernandez MD  Performed by:   Anesthesiologist

## 2021-09-08 NOTE — OPERATIVE REPORT
Operative Note    Patient Name: Cheryle Corcoran    Preoperative Diagnosis: Anomalous RCA, CAD.     Postoperative Diagnosis: same    Primary Surgeon: Radhika Fischer MD    Assistant: Melissa Thompson PA-C    Procedures: CABG x 1; SVG to RCA    Anesthesia: Cardiac

## 2021-09-08 NOTE — ANESTHESIA PREPROCEDURE EVALUATION
PRE-OP EVALUATION    Patient Name: Ane [place]    Admit Diagnosis: Anomalous RCA, CAD. Pre-op Diagnosis: Anomalous RCA, CAD. CORONARY ARTERY BYPASS GRAFT.     Anesthesia Procedure: CORONARY ARTERY BYPASS GRAFT. (N/A )    Surgeon(s) and Role:     * hypothyroidism    (+) anemia            (+) arthritis       Pulmonary                           Neuro/Psych                 (+) neuromuscular disease             Anemia   Arthritis  History of colon polyps   Hypothyroid  Ulcerative colitis ([facility] [address]   Osteopor [facility]   06/07/2021 18:02     ECG 8/2021:  NSR          Past Surgical History:   Procedure Laterality Date   • FRACTURE SURGERY     • LUMPECTOMY RIGHT  2014     Social History    Tobacco Use      Smoking status: Never Smoker      Smokeless tobacco: Never Use anesthetic management were answered.       Plan/risks discussed with: patient  Use of blood product(s) discussed with: patient              Present on Admission:  **None**

## 2021-09-08 NOTE — H&P
[facility] Hospitalist Team  History and Physical       Assessment/Plan:     #CAD s/p CABG x 1; SVG to RCA  -per ct surgery and cards  -on BB and astatin  -currently intubated, plan to extubate today  -on insulin drip    #HL- cont statin  #HTN- BB  #Hx DVT- resum Fam Hx  Family History   Problem Relation Age of Onset   • Heart Disorder Father    • Hypertension Father    • Cancer Father         COLON   • Kidney Disease Father    • Thyroid disease Father    • Heart Disorder Mother    • Hypertension Mother    • St AP PORTABLE  (CPT=71045), 8/30/2021, 2:00 PM.  INDICATIONS:  s/p surgery  PATIENT STATED HISTORY: (As transcribed by Technologist)  Patient offered no additional h istory.     FINDINGS:  There has been interval thoracic surgery with ET tube noted, the tip o Technologist)  Patient offered no additional history at this time. FINDINGS:  The heart is normal in size. The lungs are clear of acute-appearing disease process. The costophrenic angles are sharp.   There is no active disease seen on the basis of port

## 2021-09-08 NOTE — ANESTHESIA POSTPROCEDURE EVALUATION
[address] Patient Status:  Inpatient   Age/Gender 63year old female MRN [id number]   [facility] 6NE-A Attending Aby Gill MD   Hosp Day # 0 PCP Aura Stuart MD       Anesthesia Post-op Note    CORONARY ARTERY

## 2021-09-08 NOTE — PROGRESS NOTES
Anesthesia Ventilator Management    Patient is s/p CABG x 1 (SVG to RCA)  POD#0. Patient is currently sedated and intubated. Vent settings: PRVC 450/10/5/60%  ABG, CXR pending    Will wean FiO2 as tolerated.   Plan for extubation after CPAP trial.      A.

## 2021-09-08 NOTE — CONSULTS
Pharmacy consulted to dose post-op antibiotics. SCr 0.54 mg/dL; estimated CrCl 96 mL/min. Ok for Ancef 2 grams IV every 8 hours x5 doses. Will adjust vancomycin to 1 gram IV every 12 hours x2 doses.     Kishore Martinez, PharmD  09/08/21 1:23 PM

## 2021-09-08 NOTE — CONSULTS
[facility]  Cardiology Consultation    Nik Ordaz Patient Status:  Inpatient    1955 MRN [id number]   [facility] 6NE-A Attending Maria Del Carmen Hayes MD   Hosp Day # 0 PCP Junior Gee MD     Reason for Consultation:  CAD    H (VERSED) 2 MG/2ML injection 1 mg, 1 mg, Intravenous, Q30 Min PRN  •  propofol (DIPRIVAN) infusion, 5-100 mcg/kg/min, Intravenous, Continuous  •  Dexmedetomidine HCl in NaCl (PRECEDEX) 400 MCG/100ML premix infusion, 0.2-1.5 mcg/kg/hr, Intravenous, Continuou 10 mg, 10 mg, Oral, Nightly  •  potassium chloride IVPB premix 20 mEq, 20 mEq, Intravenous, PRN **OR** potassium chloride IVPB premix 40 mEq, 40 mEq, Intravenous, PRN  •  calcium gluconate 3 g in sodium chloride 0.9% 100 mL IVPB, 3 g, Intravenous, PRN  • Intake 1985 ml   Output 2120 ml   Net -135 ml     Wt Readings from Last 3 Encounters:  08/27/21 : 167 lb (75.8 kg)      Physical Exam:   General: Alert and oriented x 3. HEENT: Normocephalic   Neck: No JVD   Cardiac: Regular rate and rhythm.  S1, S2 no

## 2021-09-08 NOTE — DIETARY NOTE
Clinical Nutrition     Dietitian consult received per cardiac rehab standing order. Pt to be educated by cardiac rehab staff and encouraged to attend outpatient classes taught by NADIA. NADIA available PRN.     Bhumi Marrero RD, [address]   Clinical Dietitian  [phone]

## 2021-09-08 NOTE — ANESTHESIA PROCEDURE NOTES
Arterial Line  Performed by: Tanisha Mccollum MD  Authorized by: Tanisha Mccollum MD     General Information and Staff    Procedure Start:  9/8/2021 6:43 AM  Procedure End:  9/8/2021 6:46 AM  Anesthesiologist:  Tanisha Mccollum MD  Performed By:  Anesthesiolog

## 2021-09-08 NOTE — PLAN OF CARE
Received pt from [facility]vor s/p cabg x1 accompanied by anesthesia. Usual lines. Propofol, precedex, dobs and insulin gtt infusing. VSS. NSR. Nitroglycerin gtt started to keep sbp <140. Ventricular wires connected to temp pacer box. VVI 50/10.  Ofirmev and morphin

## 2021-09-08 NOTE — ANESTHESIA PROCEDURE NOTES
Airway  Date/Time: 9/8/2021 6:48 AM  Urgency: elective      General Information and Staff    Patient location during procedure: OR  Anesthesiologist: Sarah Morales MD  Performed: anesthesiologist     Indications and Patient Condition  Indications for airw

## 2021-09-08 NOTE — ANESTHESIA PROCEDURE NOTES
Procedure Performed: SHAYE       Start Time:  9/8/2021 7:20 AM       End Time:   9/8/2021 10:15 AM    Preanesthesia Checklist:  Patient identified, IV assessed, risks and benefits discussed, monitors and equipment assessed, procedure being performed at surge Aorta    Ascending Aorta:  Size normal.  Diameter 2.9 cm. Dissection not present. Plaque thickness less than 3 mm. Mobile plaque not present. Descending Aorta:  Size normal.  Diameter 2.1 cm. Dissection not present.   Plaque thickness less th

## 2021-09-09 ENCOUNTER — APPOINTMENT (OUTPATIENT)
Dept: GENERAL RADIOLOGY | Facility: HOSPITAL | Age: 66
DRG: 236 | End: 2021-09-09
Attending: THORACIC SURGERY (CARDIOTHORACIC VASCULAR SURGERY)
Payer: COMMERCIAL

## 2021-09-09 ENCOUNTER — APPOINTMENT (OUTPATIENT)
Dept: GENERAL RADIOLOGY | Facility: HOSPITAL | Age: 66
DRG: 236 | End: 2021-09-09
Attending: PHYSICIAN ASSISTANT
Payer: COMMERCIAL

## 2021-09-09 PROBLEM — Z95.1 S/P CABG (CORONARY ARTERY BYPASS GRAFT): Status: ACTIVE | Noted: 2021-09-09

## 2021-09-09 LAB
ATRIAL RATE: 127 BPM
ATRIAL RATE: 81 BPM
ATRIAL RATE: 91 BPM
BASE EXCESS BLDA CALC-SCNC: 2 MMOL/L (ref ?–30)
BASOPHILS # BLD AUTO: 0.01 X10(3) UL (ref 0–0.2)
BASOPHILS NFR BLD AUTO: 0.1 %
BUN BLD-MCNC: 14 MG/DL (ref 7–18)
CA-I BLDA-SCNC: 1.31 MMOL/L (ref 1.12–1.32)
CALCIUM BLD-MCNC: 7.5 MG/DL (ref 8.5–10.1)
CHLORIDE SERPL-SCNC: 114 MMOL/L (ref 98–112)
CHOLEST SMN-MCNC: 133 MG/DL (ref ?–200)
CO2 BLDA-SCNC: 28 MMOL/L (ref 22–32)
CO2 SERPL-SCNC: 23 MMOL/L (ref 21–32)
CREAT BLD-MCNC: 0.66 MG/DL
EOSINOPHIL # BLD AUTO: 0 X10(3) UL (ref 0–0.7)
EOSINOPHIL NFR BLD AUTO: 0 %
ERYTHROCYTE [DISTWIDTH] IN BLOOD BY AUTOMATED COUNT: 12.6 %
GLUCOSE BLD-MCNC: 105 MG/DL (ref 70–99)
GLUCOSE BLD-MCNC: 106 MG/DL (ref 70–99)
GLUCOSE BLD-MCNC: 108 MG/DL (ref 70–99)
GLUCOSE BLD-MCNC: 108 MG/DL (ref 70–99)
GLUCOSE BLD-MCNC: 109 MG/DL (ref 70–99)
GLUCOSE BLD-MCNC: 115 MG/DL (ref 70–99)
GLUCOSE BLD-MCNC: 117 MG/DL (ref 70–99)
GLUCOSE BLD-MCNC: 124 MG/DL (ref 70–99)
GLUCOSE BLD-MCNC: 143 MG/DL (ref 70–99)
GLUCOSE BLD-MCNC: 149 MG/DL (ref 70–99)
GLUCOSE BLD-MCNC: 167 MG/DL (ref 70–99)
GLUCOSE BLDA-MCNC: 139 MG/DL (ref 70–99)
HCO3 BLDA-SCNC: 26.3 MEQ/L (ref 22–26)
HCT VFR BLD AUTO: 28.2 %
HCT VFR BLDA CALC: 27 %
HDLC SERPL-MCNC: 48 MG/DL (ref 40–59)
HGB BLD-MCNC: 9.5 G/DL
IMM GRANULOCYTES # BLD AUTO: 0.09 X10(3) UL (ref 0–1)
IMM GRANULOCYTES NFR BLD: 0.5 %
LDLC SERPL CALC-MCNC: 71 MG/DL (ref ?–100)
LYMPHOCYTES # BLD AUTO: 0.87 X10(3) UL (ref 1–4)
LYMPHOCYTES NFR BLD AUTO: 4.9 %
MAGNESIUM SERPL-MCNC: 1.9 MG/DL (ref 1.6–2.6)
MCH RBC QN AUTO: 30.5 PG (ref 26–34)
MCHC RBC AUTO-ENTMCNC: 33.7 G/DL (ref 31–37)
MCV RBC AUTO: 90.7 FL
MONOCYTES # BLD AUTO: 1.19 X10(3) UL (ref 0.1–1)
MONOCYTES NFR BLD AUTO: 6.7 %
NEUTROPHILS # BLD AUTO: 15.56 X10 (3) UL (ref 1.5–7.7)
NEUTROPHILS # BLD AUTO: 15.56 X10(3) UL (ref 1.5–7.7)
NEUTROPHILS NFR BLD AUTO: 87.8 %
NONHDLC SERPL-MCNC: 85 MG/DL (ref ?–130)
P AXIS: 30 DEGREES
P AXIS: 31 DEGREES
P-R INTERVAL: 140 MS
P-R INTERVAL: 140 MS
PCO2 BLDA: 40.9 MMHG (ref 35–45)
PH BLDA: 7.42 [PH] (ref 7.35–7.45)
PLATELET # BLD AUTO: 126 10(3)UL (ref 150–450)
PO2 BLDA: >400 MMHG (ref 80–105)
POTASSIUM SERPL-SCNC: 3.9 MMOL/L (ref 3.5–5.1)
Q-T INTERVAL: 280 MS
Q-T INTERVAL: 356 MS
Q-T INTERVAL: 370 MS
QRS DURATION: 66 MS
QRS DURATION: 72 MS
QRS DURATION: 80 MS
QTC CALCULATION (BEZET): 408 MS
QTC CALCULATION (BEZET): 413 MS
QTC CALCULATION (BEZET): 455 MS
R AXIS: -5 DEGREES
R AXIS: -9 DEGREES
R AXIS: 13 DEGREES
RBC # BLD AUTO: 3.11 X10(6)UL
SAO2 % BLDA: 100 % (ref 92–100)
SODIUM BLDA-SCNC: 139 MMOL/L (ref 136–145)
SODIUM BLDA-SCNC: 4.8 MMOL/L (ref 3.6–5.1)
SODIUM SERPL-SCNC: 141 MMOL/L (ref 136–145)
T AXIS: -4 DEGREES
T AXIS: 26 DEGREES
T AXIS: 80 DEGREES
TRIGL SERPL-MCNC: 69 MG/DL (ref 30–149)
VENTRICULAR RATE: 128 BPM
VENTRICULAR RATE: 81 BPM
VENTRICULAR RATE: 91 BPM
VLDLC SERPL CALC-MCNC: 11 MG/DL (ref 0–30)
WBC # BLD AUTO: 17.7 X10(3) UL (ref 4–11)

## 2021-09-09 PROCEDURE — 97165 OT EVAL LOW COMPLEX 30 MIN: CPT

## 2021-09-09 PROCEDURE — 82962 GLUCOSE BLOOD TEST: CPT

## 2021-09-09 PROCEDURE — 97535 SELF CARE MNGMENT TRAINING: CPT

## 2021-09-09 PROCEDURE — 80048 BASIC METABOLIC PNL TOTAL CA: CPT | Performed by: PHYSICIAN ASSISTANT

## 2021-09-09 PROCEDURE — 93010 ELECTROCARDIOGRAM REPORT: CPT | Performed by: INTERNAL MEDICINE

## 2021-09-09 PROCEDURE — 97116 GAIT TRAINING THERAPY: CPT

## 2021-09-09 PROCEDURE — P9045 ALBUMIN (HUMAN), 5%, 250 ML: HCPCS | Performed by: THORACIC SURGERY (CARDIOTHORACIC VASCULAR SURGERY)

## 2021-09-09 PROCEDURE — C9113 INJ PANTOPRAZOLE SODIUM, VIA: HCPCS | Performed by: PHYSICIAN ASSISTANT

## 2021-09-09 PROCEDURE — 71045 X-RAY EXAM CHEST 1 VIEW: CPT | Performed by: THORACIC SURGERY (CARDIOTHORACIC VASCULAR SURGERY)

## 2021-09-09 PROCEDURE — 93005 ELECTROCARDIOGRAM TRACING: CPT

## 2021-09-09 PROCEDURE — 97162 PT EVAL MOD COMPLEX 30 MIN: CPT

## 2021-09-09 PROCEDURE — 80061 LIPID PANEL: CPT | Performed by: THORACIC SURGERY (CARDIOTHORACIC VASCULAR SURGERY)

## 2021-09-09 PROCEDURE — 83735 ASSAY OF MAGNESIUM: CPT | Performed by: PHYSICIAN ASSISTANT

## 2021-09-09 PROCEDURE — 71045 X-RAY EXAM CHEST 1 VIEW: CPT | Performed by: PHYSICIAN ASSISTANT

## 2021-09-09 PROCEDURE — 85025 COMPLETE CBC W/AUTO DIFF WBC: CPT | Performed by: PHYSICIAN ASSISTANT

## 2021-09-09 RX ORDER — ALBUMIN, HUMAN INJ 5% 5 %
500 SOLUTION INTRAVENOUS ONCE
Status: COMPLETED | OUTPATIENT
Start: 2021-09-09 | End: 2021-09-09

## 2021-09-09 RX ORDER — DEXTROSE MONOHYDRATE 25 G/50ML
50 INJECTION, SOLUTION INTRAVENOUS
Status: DISCONTINUED | OUTPATIENT
Start: 2021-09-09 | End: 2021-09-09

## 2021-09-09 RX ORDER — ASPIRIN 81 MG/1
81 TABLET ORAL DAILY
Status: DISCONTINUED | OUTPATIENT
Start: 2021-09-09 | End: 2021-09-14

## 2021-09-09 RX ORDER — HYDROCODONE BITARTRATE AND ACETAMINOPHEN 10; 325 MG/1; MG/1
1 TABLET ORAL EVERY 6 HOURS PRN
Status: DISCONTINUED | OUTPATIENT
Start: 2021-09-09 | End: 2021-09-10

## 2021-09-09 RX ORDER — HEPARIN SODIUM 5000 [USP'U]/ML
5000 INJECTION, SOLUTION INTRAVENOUS; SUBCUTANEOUS EVERY 12 HOURS SCHEDULED
Status: DISCONTINUED | OUTPATIENT
Start: 2021-09-09 | End: 2021-09-10

## 2021-09-09 RX ORDER — HYDROCODONE BITARTRATE AND ACETAMINOPHEN 10; 325 MG/1; MG/1
2 TABLET ORAL EVERY 6 HOURS PRN
Status: DISCONTINUED | OUTPATIENT
Start: 2021-09-09 | End: 2021-09-10

## 2021-09-09 RX ORDER — AMIODARONE HYDROCHLORIDE 200 MG/1
400 TABLET ORAL DAILY
Status: DISCONTINUED | OUTPATIENT
Start: 2021-09-10 | End: 2021-09-14

## 2021-09-09 RX ORDER — AMIODARONE HYDROCHLORIDE 200 MG/1
400 TABLET ORAL EVERY 6 HOURS
Status: COMPLETED | OUTPATIENT
Start: 2021-09-09 | End: 2021-09-10

## 2021-09-09 NOTE — OPERATIVE REPORT
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 Given the dense adhesions between the aorta and the pulmonary artery, I abandoned the idea of trying to unroof the artery through the wall of the aorta and/or transplant it.   The right coronary artery was identified just beyond the

## 2021-09-09 NOTE — PHYSICAL THERAPY NOTE
PHYSICAL THERAPY EVALUATION - INPATIENT     Room Number: 0540/7119-B  Evaluation Date: 9/9/2021  Type of Evaluation: Initial  Physician Order: PT Eval and Treat    Presenting Problem: S/p CABG x4  Reason for Therapy: Mobility Dysfunction and Discharg Poor +      ACTIVITY TOLERANCE  Pulse: 96  Heart Rate Source: Monitor  Resp: 18  BP: 101/51  BP Location: Right arm  BP Method: Automatic  Patient Position: Sitting    O2 WALK  Oxygen Therapy  SPO2% Ambulation on Oxygen: 100  Ambulation oxygen flow (liters training  Transfer training    Patient End of Session: Up in chair;Needs met;Call light within reach;RN aware of session/findings; All patient questions and concerns addressed; Family present (Rn Luisa aware of eval session (watching for [place]))    ASSESSME

## 2021-09-09 NOTE — PLAN OF CARE
Received pt at 1930 on dobs and insulin gtt w/ usual lines. VSS, NSR, NC at 4 and weaning. Ventricular wires connected to temp pacer box. Midsternal and L leg dressing remain c/d/I. Insulin titrated per protocol. Pain managed w/ morphine prn and sched.  Ofi

## 2021-09-09 NOTE — CM/SW NOTE
64 yo sp CABG. Post op protocol order for CM/SW. CM/SW assessment done prior to admission. Pt lives with spouse. PT eval is pending. Home health referrals sent via [address]. Asked home health liaison to f/u.  / to remain av

## 2021-09-09 NOTE — PROGRESS NOTES
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

## 2021-09-09 NOTE — PLAN OF CARE
Assumed care this morning, pt up in chair for the first time, waiting on breakfast.  NSR on monitor, SBP labile, high 80-100s on low dose dobutamine. Albumin given as ordered then able to wean off dobs.   Pt taking clear liquids without issues, advancing a temperature  - Assess for signs of decreased coronary artery perfusion - ex.  Angina  - Evaluate fluid balance, assess for edema, trend weights  Outcome: Progressing  Goal: Absence of cardiac arrhythmias or at baseline  Description: INTERVENTIONS:  - Contin

## 2021-09-09 NOTE — PROGRESS NOTES
[facility] Hospitalist Team  Progress Note      George Amato  12/11/1955    Assessment/Plan:     #CAD s/p CABG x 1; SVG to RCA  -per ct surgery and cards  -on BB and astatin  -extubated 9/8  -on insulin drip, transition to SSI low dose  -de-line today, increase 15 mL Mouth/Throat [email or web address]   • docusate sodium  100 mg Oral BID   • metoprolol tartrate  12.5 mg Oral 2x Daily(Beta Blocker)   • atorvastatin  10 mg Oral Nightly   • mupirocin  1 Application Nasal BID   • pantoprazole (PROTONIX) IV push  40 mg Amanda Shi

## 2021-09-09 NOTE — OCCUPATIONAL THERAPY NOTE
OCCUPATIONAL THERAPY EVALUATION - INPATIENT     Room Number: 7335/7700-H  Evaluation Date: 9/9/2021  Type of Evaluation: Initial  Presenting Problem: CABG    Physician Order: IP Consult to Occupational Therapy  Reason for Therapy: ADL/IADL Dysfunction and ASSESSMENT  Upper extremity ROM is within functional limits     Upper extremity strength is within functional limits     COORDINATION  Gross Motor    [facility]    Fine Motor    [facility]      ADDITIONAL TESTS                                    NEUROLOGICAL FINDINGS profile noted above. Functional outcome measures completed include ROM. In this OT evaluation patient presents with the following performance deficits: impaired knowledge about sternal precautions.  These deficits impact the patient’s ability to participate bilateral AROM HEP (home exercise program). Additional Goals:  Recall sternal precautions. PPE worn by this OT: goggles, surgical mask and gloves. Patient wore surgical mask while in the hallway.

## 2021-09-09 NOTE — PROGRESS NOTES
[facility]  Progress Note    Veronica Other Patient Status:  Inpatient    1955 MRN [id number]   [facility] 6NE-A Attending Darrell Escobar MD   Hosp Day # 1 PCP Alejandrina Rivera MD     Assessment:  #Day 1 post CABG for anomalous

## 2021-09-10 LAB
ANION GAP SERPL CALC-SCNC: 4 MMOL/L (ref 0–18)
APTT PPP: 33.8 SECONDS (ref 25.4–36.1)
APTT PPP: 62.4 SECONDS (ref 25.4–36.1)
APTT PPP: 81.9 SECONDS (ref 25.4–36.1)
BLOOD TYPE BARCODE: 5100
BUN BLD-MCNC: 15 MG/DL (ref 7–18)
CALCIUM BLD-MCNC: 7.9 MG/DL (ref 8.5–10.1)
CHLORIDE SERPL-SCNC: 111 MMOL/L (ref 98–112)
CO2 SERPL-SCNC: 21 MMOL/L (ref 21–32)
CREAT BLD-MCNC: 0.77 MG/DL
ERYTHROCYTE [DISTWIDTH] IN BLOOD BY AUTOMATED COUNT: 13.2 %
GLUCOSE BLD-MCNC: 118 MG/DL (ref 70–99)
GLUCOSE BLD-MCNC: 122 MG/DL (ref 70–99)
GLUCOSE BLD-MCNC: 124 MG/DL (ref 70–99)
GLUCOSE BLD-MCNC: 132 MG/DL (ref 70–99)
GLUCOSE BLD-MCNC: 157 MG/DL (ref 70–99)
HCT VFR BLD AUTO: 28.4 %
HGB BLD-MCNC: 8.8 G/DL
MCH RBC QN AUTO: 31.1 PG (ref 26–34)
MCHC RBC AUTO-ENTMCNC: 31 G/DL (ref 31–37)
MCV RBC AUTO: 100.4 FL
OSMOLALITY SERPL CALC.SUM OF ELEC: 284 MOSM/KG (ref 275–295)
PLATELET # BLD AUTO: 94 10(3)UL (ref 150–450)
POTASSIUM SERPL-SCNC: 4.7 MMOL/L (ref 3.5–5.1)
RBC # BLD AUTO: 2.83 X10(6)UL
SODIUM SERPL-SCNC: 136 MMOL/L (ref 136–145)
WBC # BLD AUTO: 14.1 X10(3) UL (ref 4–11)

## 2021-09-10 PROCEDURE — 80048 BASIC METABOLIC PNL TOTAL CA: CPT | Performed by: PHYSICIAN ASSISTANT

## 2021-09-10 PROCEDURE — 85730 THROMBOPLASTIN TIME PARTIAL: CPT | Performed by: THORACIC SURGERY (CARDIOTHORACIC VASCULAR SURGERY)

## 2021-09-10 PROCEDURE — C9113 INJ PANTOPRAZOLE SODIUM, VIA: HCPCS | Performed by: PHYSICIAN ASSISTANT

## 2021-09-10 PROCEDURE — 85027 COMPLETE CBC AUTOMATED: CPT | Performed by: PHYSICIAN ASSISTANT

## 2021-09-10 PROCEDURE — 86022 PLATELET ANTIBODIES: CPT | Performed by: PHYSICIAN ASSISTANT

## 2021-09-10 PROCEDURE — 82962 GLUCOSE BLOOD TEST: CPT

## 2021-09-10 PROCEDURE — P9045 ALBUMIN (HUMAN), 5%, 250 ML: HCPCS | Performed by: THORACIC SURGERY (CARDIOTHORACIC VASCULAR SURGERY)

## 2021-09-10 RX ORDER — HEPARIN SODIUM AND DEXTROSE 10000; 5 [USP'U]/100ML; G/100ML
INJECTION INTRAVENOUS CONTINUOUS
Status: DISCONTINUED | OUTPATIENT
Start: 2021-09-10 | End: 2021-09-12

## 2021-09-10 RX ORDER — FUROSEMIDE 10 MG/ML
20 INJECTION INTRAMUSCULAR; INTRAVENOUS ONCE
Status: DISCONTINUED | OUTPATIENT
Start: 2021-09-10 | End: 2021-09-14

## 2021-09-10 RX ORDER — DILTIAZEM HYDROCHLORIDE 5 MG/ML
10 INJECTION INTRAVENOUS EVERY 2 HOUR PRN
Status: DISCONTINUED | OUTPATIENT
Start: 2021-09-10 | End: 2021-09-14

## 2021-09-10 RX ORDER — HYDROCODONE BITARTRATE AND ACETAMINOPHEN 5; 325 MG/1; MG/1
2 TABLET ORAL EVERY 4 HOURS PRN
Status: DISCONTINUED | OUTPATIENT
Start: 2021-09-10 | End: 2021-09-13

## 2021-09-10 RX ORDER — HYDROCODONE BITARTRATE AND ACETAMINOPHEN 5; 325 MG/1; MG/1
1 TABLET ORAL EVERY 4 HOURS PRN
Status: DISCONTINUED | OUTPATIENT
Start: 2021-09-10 | End: 2021-09-13

## 2021-09-10 RX ORDER — ALBUMIN, HUMAN INJ 5% 5 %
500 SOLUTION INTRAVENOUS ONCE
Status: COMPLETED | OUTPATIENT
Start: 2021-09-10 | End: 2021-09-10

## 2021-09-10 RX ORDER — HEPARIN SODIUM AND DEXTROSE 10000; 5 [USP'U]/100ML; G/100ML
700 INJECTION INTRAVENOUS ONCE
Status: COMPLETED | OUTPATIENT
Start: 2021-09-10 | End: 2021-09-10

## 2021-09-10 RX ORDER — DIGOXIN 0.25 MG/ML
250 INJECTION INTRAMUSCULAR; INTRAVENOUS ONCE
Status: COMPLETED | OUTPATIENT
Start: 2021-09-10 | End: 2021-09-10

## 2021-09-10 NOTE — CM/SW NOTE
Met with pt and spouse. Choice list provided. [facility] is not able to accept pt as they do not cover [facility]. Pt chose [facility] [phone]. / to remain available for support and/or discharge planning.

## 2021-09-10 NOTE — PAYOR COMM NOTE
--------------  CONTINUED STAY REVIEW    Payor: JERAD JOSEPH  Subscriber #:  [id number]  Authorization Number: [id number]    Admit date: 9/8/21  Admit time:  5:24 AM    Admitting Physician: Lis Swanson MD  Attending Physician:  Lis Swanson MD  Primary Ca BP improved w/ albumin                - PAF - rates better now back on Cardizem gtt. BBlocker, PO Amio.  Heparin gtt                - Cr stable                - Expected Post Op Anemia - asymptomatic, likely dilutional - follow CBC                - mild reilly mg/hr Intravenous Yessenai Salinas RN    9/9/2021 8132 Rate/Dose Change 5 mg/hr Intravenous Yessenia Salinas RN    9/9/2021 2002 Rate/Dose Change 5 mg/hr Intravenous Yessenia Salinas RN    9/9/2021 1614 New Bag 10 mg/hr Intravenous Raquel Garcia RN      di Intravenous Jessie Mondragon RN    9/9/2021 1015 Given 4 mg Intravenous Nicki Raudel RN      Pantoprazole Sodium (PROTONIX) 40 mg in Sodium Chloride (PF) 0.9 % 10 mL IV push     Date Action Dose Route User    9/10/2021 0654 Given by Other 40 mg Intraven

## 2021-09-10 NOTE — PROGRESS NOTES
[facility]  Progress Note    Parker Estevez Patient Status:  Inpatient    1955 MRN [id number]   [facility] [place] 6NE-A Attending Lizandro Ledezma MD   Hosp Day # 2 PCP Allie Lara MD     Subjective:  Pt doing OK this morning.  Pain 177 lb 14.6 oz (80.7 kg), SpO2 96 %.   General: A&O X 3, VSS, NAD, afeb  Neck: Cordis RIJ  Lungs: CTAB  Heart: irregirreg  Abdomen: soft, hypoactive BS present  Extremities: no edema noted  Skin: warm/dry  Neurological: grossly intact    Assessment/Plan: S/

## 2021-09-10 NOTE — PLAN OF CARE
Assumed care this morning, pt sitting up in chair waiting on breakfast.  Afib on monitor, rate controlled (80-90s)  but increases with minimal activity to 130s. Between 6755-3881 pt went between NSR on afib, however after 1000 remained in afib.  BP labile Assess pain using appropriate pain scale  - Administer analgesics based on type and severity of pain and evaluate response  - Implement non-pharmacological measures as appropriate and evaluate response  - Consider cultural and social influences on pain and

## 2021-09-10 NOTE — PROGRESS NOTES
[facility] Hospitalist Team  Progress Note      Lavonne Trivedi  12/11/1955    Assessment/Plan:     #CAD s/p CABG x 1; SVG to RCA  -per ct surgery and cards  -on BB and astatin  -extubated 9/8  -on insulin drip, transitioned to SSI low dose  -chest tube removed, i TPROT    Recent Labs   Lab 09/09/21  0618 09/09/21  1115 09/09/21  1845 09/09/21  2205 09/10/21  0513   PGLU 108* 149* 167* 143* 124*       Meds:   Scheduled:   • digoxin  250 mcg Intravenous Once   • Insulin Aspart Pen  1-10 Units Subcutaneous TID AC and

## 2021-09-11 LAB
ANION GAP SERPL CALC-SCNC: 3 MMOL/L (ref 0–18)
APTT PPP: 66.8 SECONDS (ref 25.4–36.1)
BUN BLD-MCNC: 18 MG/DL (ref 7–18)
CALCIUM BLD-MCNC: 8.4 MG/DL (ref 8.5–10.1)
CHLORIDE SERPL-SCNC: 112 MMOL/L (ref 98–112)
CO2 SERPL-SCNC: 22 MMOL/L (ref 21–32)
CREAT BLD-MCNC: 0.71 MG/DL
ERYTHROCYTE [DISTWIDTH] IN BLOOD BY AUTOMATED COUNT: 12.7 %
GLUCOSE BLD-MCNC: 134 MG/DL (ref 70–99)
GLUCOSE BLD-MCNC: 140 MG/DL (ref 70–99)
GLUCOSE BLD-MCNC: 143 MG/DL (ref 70–99)
GLUCOSE BLD-MCNC: 153 MG/DL (ref 70–99)
GLUCOSE BLD-MCNC: 155 MG/DL (ref 70–99)
HCT VFR BLD AUTO: 26 %
HEPARIN INDUCED PLT ANTIBODY: NEGATIVE
HGB BLD-MCNC: 8.3 G/DL
MCH RBC QN AUTO: 29.9 PG (ref 26–34)
MCHC RBC AUTO-ENTMCNC: 31.9 G/DL (ref 31–37)
MCV RBC AUTO: 93.5 FL
OSMOLALITY SERPL CALC.SUM OF ELEC: 288 MOSM/KG (ref 275–295)
PLATELET # BLD AUTO: 107 10(3)UL (ref 150–450)
PLATELET # BLD AUTO: 107 10(3)UL (ref 150–450)
POTASSIUM SERPL-SCNC: 4.7 MMOL/L (ref 3.5–5.1)
RBC # BLD AUTO: 2.78 X10(6)UL
SODIUM SERPL-SCNC: 137 MMOL/L (ref 136–145)
WBC # BLD AUTO: 10.1 X10(3) UL (ref 4–11)

## 2021-09-11 PROCEDURE — 82962 GLUCOSE BLOOD TEST: CPT

## 2021-09-11 PROCEDURE — 97116 GAIT TRAINING THERAPY: CPT

## 2021-09-11 PROCEDURE — 85049 AUTOMATED PLATELET COUNT: CPT | Performed by: THORACIC SURGERY (CARDIOTHORACIC VASCULAR SURGERY)

## 2021-09-11 PROCEDURE — 85027 COMPLETE CBC AUTOMATED: CPT | Performed by: THORACIC SURGERY (CARDIOTHORACIC VASCULAR SURGERY)

## 2021-09-11 PROCEDURE — 97110 THERAPEUTIC EXERCISES: CPT

## 2021-09-11 PROCEDURE — 85730 THROMBOPLASTIN TIME PARTIAL: CPT | Performed by: THORACIC SURGERY (CARDIOTHORACIC VASCULAR SURGERY)

## 2021-09-11 PROCEDURE — 80048 BASIC METABOLIC PNL TOTAL CA: CPT | Performed by: THORACIC SURGERY (CARDIOTHORACIC VASCULAR SURGERY)

## 2021-09-11 NOTE — PROGRESS NOTES
[facility]  Progress Note    Sabiha Gurrola Patient Status:  Inpatient    1955 MRN [id number]   [facility] 6NE-A Attending Rita Barone MD   Hosp Day # 3 PCP Moira Mayfield MD     Assessment:  #Day 3 post CABG for anomalous

## 2021-09-11 NOTE — PLAN OF CARE
Assumed care of patient at [address] [facility]. Patient is AOX4, following commands, and BALTAZAR. Norco given for midsternal pain. Patient states she becomes nauseous when takes Norco, however Billie Lawler is managing her pain. Zofran given as needed. Heparin gtt infusing per ACS pr

## 2021-09-11 NOTE — PLAN OF CARE
Pt has remained hemodynamically stable. Pt remains in controlled afib while at rest.  HR elevated with activity but does come down with rest.  Pt remains on therapeutic heparin.   Pt received sitting in the chair this am.  Pt with better appetite, able to

## 2021-09-11 NOTE — PROGRESS NOTES
[facility] Hospitalist Team  Progress Note      Francoise Mccoy  12/11/1955    Assessment/Plan:     #CAD s/p CABG x 1; SVG to RCA  -per ct surgery and cards  -on BB and statin  -extubated 9/8  -on insulin drip, transitioned to SSI low dose  -chest tube removed, in LIPASE, LDH in the last 168 hours.     Invalid input(s): ALPHOS, TBIL, DBIL, TPROT    Recent Labs   Lab 09/10/21  1106 09/10/21  1614 09/10/21  2154 09/11/21  0635 09/11/21  1151   PGLU 122* 132* 157* 155* 140*       Meds:   Scheduled:   • furosemide  20 mg

## 2021-09-11 NOTE — PROGRESS NOTES
[facility]   [facility] Progress Note    Anola Lm Patient Status:  Inpatient    1955 MRN [id number]   [facility] 6NE-A Attending Anny Payne MD   Hosp Day # 3 PCP Lyndsey Bermudez MD     Subjective:  Sitting up in chair feel (cardIZEM) 1 mg/mL IVPB add-vantage, 10 mg/hr, Intravenous, Continuous  amiodarone (PACERONE) tab 400 mg, 400 mg, Oral, Daily  metoprolol tartrate (LOPRESSOR) tab 25 mg, 25 mg, Oral, 2x Daily(Beta Blocker)  0.9% NaCl infusion, , Intravenous, Continuous  mo (PROTONIX) 40 mg in Sodium Chloride (PF) 0.9 % 10 mL IV push, 40 mg, Intravenous, QAM AC   Or  pantoprazole (PROTONIX) EC tab 40 mg, 40 mg, Oral, QAM AC  scopolamine (TRANSDERM-SCOP) patch, 1 patch, Transdermal, Q72H  glucose (DEX4) oral liquid 15 g, 15 g, discussed with Nadeem Decker .  NPO after Midnight     D/W Dr.Foy Servando Martinez, RN  9/11/2021  7:24 AM

## 2021-09-11 NOTE — PHYSICAL THERAPY NOTE
PHYSICAL THERAPY TREATMENT NOTE - INPATIENT    Room Number: 3343/8403-T     Session: 1   Number of Visits to Meet Established Goals: 3    Presenting Problem: S/p CABG x4    Problem List  Principal Problem:    S/P CABG (coronary artery bypass graft)      P A Little   -   Need to walk in hospital room?: A Little   -   Climbing 3-5 steps with a railing?: A Little       AM-PAC Score:  Raw Score: 18   Approx Degree of Impairment: 46.58%   Standardized Score (AM-PAC Scale): 43.63   CMS Modifier (G-Code): CK    FU gait/transfers resulting in downgrade of overall functional mobility. Due to above deficits, Pt will benefit from continued IP PT, so that patient may achieve highest functional independence/return to baseline.        Transfers with nursing staff: 1 assist

## 2021-09-12 LAB
APTT PPP: 46.3 SECONDS (ref 25.4–36.1)
PLATELET # BLD AUTO: 160 10(3)UL (ref 150–450)

## 2021-09-12 PROCEDURE — 93005 ELECTROCARDIOGRAM TRACING: CPT

## 2021-09-12 PROCEDURE — 93010 ELECTROCARDIOGRAM REPORT: CPT | Performed by: INTERNAL MEDICINE

## 2021-09-12 PROCEDURE — 85049 AUTOMATED PLATELET COUNT: CPT | Performed by: THORACIC SURGERY (CARDIOTHORACIC VASCULAR SURGERY)

## 2021-09-12 PROCEDURE — 85730 THROMBOPLASTIN TIME PARTIAL: CPT | Performed by: INTERNAL MEDICINE

## 2021-09-12 RX ORDER — ONDANSETRON 2 MG/ML
INJECTION INTRAMUSCULAR; INTRAVENOUS
Status: DISPENSED
Start: 2021-09-12 | End: 2021-09-13

## 2021-09-12 NOTE — PROGRESS NOTES
[facility]   [facility] Progress Note    Cheryle Corcoran Patient Status:  Inpatient    1955 MRN [id number]   [facility] 6NE-A Attending Milagro Arnold MD   Hosp Day # 4 PCP Padmini Starks MD     Subjective:    C/O cough not coughing injection 2 mg, 2 mg, Intravenous, Q2H PRN   Or  morphINE sulfate (PF) 4 MG/ML injection 4 mg, 4 mg, Intravenous, Q2H PRN   Or  morphINE sulfate (PF) 4 MG/ML injection 6 mg, 6 mg, Intravenous, Q2H PRN  melatonin tab 3 mg, 3 mg, Oral, Nightly PRN  docusate intact placed clean gauze dressing. Ecchymosis to site.         Assessment/Plan:  Patient Active Problem List:     Family history of colon cancer in father     Benign breast disease     Insomnia due to medical condition     S/P CABG (coronary artery bypass

## 2021-09-12 NOTE — PROGRESS NOTES
[facility] Hospitalist Team  Progress Note      Sabiha Gurroal  12/11/1955    Assessment/Plan:     #CAD s/p CABG x 1; SVG to RCA  -per ct surgery and cards  -on BB and statin  -extubated 9/8  -on insulin drip, transitioned to SSI low dose  -chest tube removed, in results for input(s): ALT, AST, ALB, AMYLASE, LIPASE, LDH in the last 168 hours.     Invalid input(s): ALPHOS, TBIL, DBIL, TPROT    Recent Labs   Lab 09/10/21  2154 09/11/21  0635 09/11/21  1151 09/11/21  1812 09/11/21  2137   PGLU 157* 155* 140* 153* 134*

## 2021-09-12 NOTE — PLAN OF CARE
Pt a/ox4. Ambulate in [place] with walker, RODGERS, fatigue. C/o fatigue, pain managed today with norco.  Queasy but no dry leaving, zofran given with norco administrations. On RA, NCP, I.S to 1000.     NSR, converted to NSR earlier this AM.  Heparin gtt d/c'd

## 2021-09-12 NOTE — PLAN OF CARE
Assumed patient care at 1500. Vital signs stable. Patient alert and oriented x 4. Patient states she is not in much pain but has intermittent nausea and generalized fatigue. Patient ambulated about 150 feet in the [place].   She tolerated fairly well but s

## 2021-09-12 NOTE — PROGRESS NOTES
[facility]  Progress Note    Cheryle Kill Patient Status:  Inpatient    1955 MRN [id number]   [facility] 6NE-A Attending Milagro Arnold MD   Hosp Day # 4 PCP Padmini Starks MD     Assessment:  #Day 4 post CABG for anomalous

## 2021-09-12 NOTE — PLAN OF CARE
Assumed care of patient at [facility]. Patient is AOX4, following commands, and BALTAZAR. Heparin gtt infusing per ACS protocol. Norco given as needed for midsternum incision pain. Patient states, nausea is \"better\" Zofran given as needed.  Plan to cardiovert patien

## 2021-09-13 LAB
ATRIAL RATE: 78 BPM
ERYTHROCYTE [DISTWIDTH] IN BLOOD BY AUTOMATED COUNT: 13.3 %
HCT VFR BLD AUTO: 25.1 %
HGB BLD-MCNC: 8.2 G/DL
MCH RBC QN AUTO: 30.6 PG (ref 26–34)
MCHC RBC AUTO-ENTMCNC: 32.7 G/DL (ref 31–37)
MCV RBC AUTO: 93.7 FL
P AXIS: 34 DEGREES
P-R INTERVAL: 120 MS
PLATELET # BLD AUTO: 213 10(3)UL (ref 150–450)
Q-T INTERVAL: 364 MS
QRS DURATION: 82 MS
QTC CALCULATION (BEZET): 414 MS
R AXIS: 28 DEGREES
RBC # BLD AUTO: 2.68 X10(6)UL
T AXIS: 12 DEGREES
VENTRICULAR RATE: 78 BPM
WBC # BLD AUTO: 13 X10(3) UL (ref 4–11)

## 2021-09-13 PROCEDURE — 97530 THERAPEUTIC ACTIVITIES: CPT

## 2021-09-13 PROCEDURE — 97110 THERAPEUTIC EXERCISES: CPT

## 2021-09-13 PROCEDURE — 97116 GAIT TRAINING THERAPY: CPT

## 2021-09-13 PROCEDURE — 85027 COMPLETE CBC AUTOMATED: CPT | Performed by: PHYSICIAN ASSISTANT

## 2021-09-13 RX ORDER — ACETAMINOPHEN 500 MG
1000 TABLET ORAL EVERY 6 HOURS PRN
Status: DISCONTINUED | OUTPATIENT
Start: 2021-09-13 | End: 2021-09-14

## 2021-09-13 RX ORDER — ACETAMINOPHEN 500 MG
500 TABLET ORAL EVERY 6 HOURS PRN
Status: DISCONTINUED | OUTPATIENT
Start: 2021-09-13 | End: 2021-09-14

## 2021-09-13 NOTE — PLAN OF CARE
Assumed care of patient at 1. Patient is AOX4, following commands, and BALTAZAR. Norco given as needed for midsternal pain. Zofran given with [place] for nausea. Remains in SR, at times  when coughing 's briefly. C/o feeling fatigued.  Dr. Barbara Pang called this

## 2021-09-13 NOTE — PROGRESS NOTES
[facility] Hospitalist Team  Progress Note      Anjana Perkins  12/11/1955    Assessment/Plan:     #CAD s/p CABG x 1; SVG to RCA  -per ct surgery and cards  -on BB and statin  -extubated 9/8  -on insulin drip, transitioned to SSI low dose  -chest tube removed, in 15 18   CREATSERUM 0.54* 0.66 0.77 0.71   CA 8.3* 7.5* 7.9* 8.4*   MG 2.7* 1.9  --   --    * 106* 118* 143*       No results for input(s): ALT, AST, ALB, AMYLASE, LIPASE, LDH in the last 168 hours.     Invalid input(s): ALPHOS, TBIL, DBIL, TPROT    R

## 2021-09-13 NOTE — PHYSICAL THERAPY NOTE
PHYSICAL THERAPY TREATMENT NOTE - INPATIENT    Room Number: 9103/5570-P     Session: 2   Number of Visits to Meet Established Goals: 3    Presenting Problem: S/p CABG x4    Problem List  Principal Problem:    S/P CABG (coronary artery bypass graft)      P Climbing 3-5 steps with a railing?: A Little       AM-PAC Score:  Raw Score: 18   Approx Degree of Impairment: 46.58%   Standardized Score (AM-PAC Scale): 43.63   CMS Modifier (G-Code): CK    FUNCTIONAL ABILITY STATUS  Gait Assessment   Gait Assistance: Lowry able to demonstrate transfers EOB to/from BSC at assistance level: modified independent      Goal #3 Patient is able to ambulate 300 feet with assist device: none at assistance level: supervision      Goal #4 Up and down 2 stoop steps w/ supervision assist

## 2021-09-13 NOTE — PAYOR COMM NOTE
--------------  CONTINUED STAY REVIEW    Payor: JERAD PPO  Subscriber #:  [id number]  Authorization Number: [id number]    Admit date: 9/8/21  Admit time:  5:24 AM    Admitting Physician: Jigna Kaye MD  Attending Physician:  Jigna Kaye MD  Primary Ca 153* 134*     #CAD s/p CABG x 1; SVG to RCA  -per ct surgery and cards  -on BB and statin  -extubated 9/8  -on insulin drip, transitioned to SSI low dose  -chest tube removed, increase activity  -lasix per cards     #PAF - post op, now sinus  -started on h Oral Rica Yarbrough, RN      aspirin EC tab 81 mg     Date Action Dose Route User    9/13/2021 1568 Given  Oral Rica Yarbrough, RN      docusate sodium (COLACE) cap 100 mg     Date Action Dose Route User    9/13/2021 4576 Given  Oral Anahi Yarbrough,

## 2021-09-13 NOTE — PROGRESS NOTES
Orders received for removal of pacer wires. Pacer wires removed. Wires intact- No tissue on ends. Dressing applied. CDI. BPs remain normal. WCTM patient.

## 2021-09-13 NOTE — PROGRESS NOTES
[facility]     CV Surgery Progress Note    Mary Richter Patient Status:  Inpatient    1955 MRN [id number]   [facility] 6NE-A Attending Sherwin Guillaume MD   Hosp Day # 5 PCP Benoit Weinberg MD     Subjective:  Patient feeling f Dr. Ambika Gonzales     Addendum: 500mg IV solumedrol ordered.      Missy Mercado PA-C   Cardiothoracic Surgery   9/13/2021  9:28 AM

## 2021-09-13 NOTE — OCCUPATIONAL THERAPY NOTE
OCCUPATIONAL THERAPY TREATMENT NOTE - INPATIENT     Room Number: 5232/8566-A  Session: 1   Number of Visits to Meet Established Goals: 3    Presenting Problem: CABG    History related to current admission: Pt was admitted from home on 9/8 for CABG.  History Modifier (G-Code): CJ    FUNCTIONAL TRANSFER ASSESSMENT  Supine to Sit : Supervision  Sit to Stand: Supervision    Skilled Therapy Provided:   Pt received semi-supine in bed.      Transfers  Supine to EOB: SUPV for log roll technique with minimal verbal cue Goals:   ADL Goals   Patient will perform grooming: with modified independent and while standing at sink  Patient will perform upper body dressing:  with modified independent  Patient will perform lower body dressing:  with modified independent  Patient wi

## 2021-09-13 NOTE — PROGRESS NOTES
[facility] Cardiology  Progress Note    Prabhakar Anton Patient Status:  Inpatient    1955 MRN [id number]   [facility] 8NE-A Attending Tristan Dick MD   Hosp Day # 5 PCP Marjorie Morrow MD       Assessment and Plan:    1.  CV -patient is s 93.7   MCH 30.5   < > 31.1  --  29.9  --  30.6   MCHC 33.7   < > 31.0  --  31.9  --  32.7   RDW 12.6   < > 13.2  --  12.7  --  13.3   NEPRELIM 15.56*  --   --   --   --   --   --    WBC 17.7*   < > 14.1*  --  10.1  --  13.0*   .0*   < > 94.0*   < >

## 2021-09-13 NOTE — PLAN OF CARE
Received patient from CCU at 0730. A+Ox4. C.o weakness. Scop patch behind L ear. VSS on RA. NSR on tele. Deneise Noun in place upon arrival to unit- removed at ~1130. Voids in bathroom- Occasionally nauseous. PRN pain medications given per MAR.  Up with SBA obtain 12 lead EKG if indicated  - Evaluate effectiveness of antiarrhythmic and heart rate control medications as ordered  - Initiate emergency measures for life threatening arrhythmias  - Monitor electrolytes and administer replacement therapy as ordered

## 2021-09-14 VITALS
OXYGEN SATURATION: 95 % | SYSTOLIC BLOOD PRESSURE: 131 MMHG | DIASTOLIC BLOOD PRESSURE: 54 MMHG | HEART RATE: 67 BPM | HEIGHT: 65 IN | BODY MASS INDEX: 30.42 KG/M2 | RESPIRATION RATE: 23 BRPM | WEIGHT: 182.56 LBS | TEMPERATURE: 97 F

## 2021-09-14 LAB
ANION GAP SERPL CALC-SCNC: 2 MMOL/L (ref 0–18)
BUN BLD-MCNC: 20 MG/DL (ref 7–18)
CALCIUM BLD-MCNC: 8.5 MG/DL (ref 8.5–10.1)
CHLORIDE SERPL-SCNC: 109 MMOL/L (ref 98–112)
CO2 SERPL-SCNC: 25 MMOL/L (ref 21–32)
CREAT BLD-MCNC: 0.69 MG/DL
GLUCOSE BLD-MCNC: 128 MG/DL (ref 70–99)
OSMOLALITY SERPL CALC.SUM OF ELEC: 286 MOSM/KG (ref 275–295)
POTASSIUM SERPL-SCNC: 5 MMOL/L (ref 3.5–5.1)
SODIUM SERPL-SCNC: 136 MMOL/L (ref 136–145)

## 2021-09-14 PROCEDURE — 80048 BASIC METABOLIC PNL TOTAL CA: CPT | Performed by: PHYSICIAN ASSISTANT

## 2021-09-14 RX ORDER — ASPIRIN 81 MG/1
81 TABLET ORAL DAILY
Qty: 90 TABLET | Refills: 1 | Status: SHIPPED | OUTPATIENT
Start: 2021-09-15

## 2021-09-14 RX ORDER — AMIODARONE HYDROCHLORIDE 200 MG/1
200 TABLET ORAL DAILY
Qty: 30 TABLET | Refills: 3 | Status: SHIPPED | OUTPATIENT
Start: 2021-09-15

## 2021-09-14 RX ORDER — PSEUDOEPHEDRINE HCL 30 MG
100 TABLET ORAL 2 TIMES DAILY PRN
Qty: 30 CAPSULE | Refills: 0 | Status: SHIPPED | OUTPATIENT
Start: 2021-09-14

## 2021-09-14 RX ORDER — AMIODARONE HYDROCHLORIDE 200 MG/1
200 TABLET ORAL DAILY
Status: DISCONTINUED | OUTPATIENT
Start: 2021-09-15 | End: 2021-09-14

## 2021-09-14 RX ORDER — ATORVASTATIN CALCIUM 10 MG/1
10 TABLET, FILM COATED ORAL NIGHTLY
Qty: 90 TABLET | Refills: 1 | Status: SHIPPED | OUTPATIENT
Start: 2021-09-14

## 2021-09-14 NOTE — PROGRESS NOTES
[facility]     Cardiology Progress Note    Francoise Mccoy Patient Status:  Inpatient    1955 MRN [id number]   [facility] 8NE-A Attending Mitali Carrizales MD   Hosp Day # 6 PCP Rosita Lopez MD       SUBJECTIVE:  POD # 6 CABG x with urination. Neuro:  Denies any headaches, focal weakness or paresthesia. All other systems reviewed and are negative.           Labs:     Recent Labs   Lab 09/08/21  4576 09/08/21  0943 09/08/21  1050 09/08/21  1050 09/09/21  0401 09/10/21  0438 09/ 20 mg, 20 mg, Intravenous, Once  aspirin EC tab 81 mg, 81 mg, Oral, Daily  amiodarone (PACERONE) tab 400 mg, 400 mg, Oral, Daily  metoprolol tartrate (LOPRESSOR) tab 25 mg, 25 mg, Oral, 2x Daily(Beta Blocker)  morphINE sulfate (PF) 2 MG/ML injection 2 mg,

## 2021-09-14 NOTE — PROGRESS NOTES
[facility]     CV Surgery Progress Note    Ines Abdi Patient Status:  Inpatient    1955 MRN [id number]   [facility] 6NE-A Attending Eli Odonnell MD   Hosp Day # 6 PCP Bran Oakley MD     Subjective:  Patient feeling a reactive, down trending, afebrile- monitor  -Acute post op blood loss anemia/tcp, expected, stable, HIPA negative- monitor   -Appears euvolemic on exam  -Renal function intact, good UOP  -Increase bowel regimen  -Nausea, resolved- avoid narcotics   -Pain m

## 2021-09-14 NOTE — PROGRESS NOTES
[facility] Hospitalist Team  Progress Note      Elizabeth Barnes  12/11/1955    Assessment/Plan:     #CAD s/p CABG x 1; SVG to RCA  -per ct surgery and cards  -on BB and statin  -extubated 9/8  -on insulin drip, transitioned to SSI low dose  -chest tube removed, in 25.0   BUN 13 14 15 18 20*   CREATSERUM 0.54* 0.66 0.77 0.71 0.69   CA 8.3* 7.5* 7.9* 8.4* 8.5   MG 2.7* 1.9  --   --   --    * 106* 118* 143* 128*       No results for input(s): ALT, AST, ALB, AMYLASE, LIPASE, LDH in the last 168 hours.     Invalid

## 2021-09-14 NOTE — PLAN OF CARE
Pt Aox4, RA, VSS. NSR. Pt still has poor appetite, but denies n&v at this time. No BM. Walks being completed. Per Bernardo Masterson, pt can be discharged with Drake Villegas.      Problem: PAIN - ADULT  Goal: Verbalizes/displays adequate comfort level or patient's stated dennis

## 2021-09-14 NOTE — PLAN OF CARE
Alert and oriented x4 on tele monitor hr 70's sinus rhythm. Mid sternal steri strips kamar c/d/I. Left thigh donor site oozing serosanguinous small amount c/d/I.  Extra strength tylenol 2 tabs for pain and zofran 4 mg ivp for nausea given as ordered with reli indicated  - Evaluate effectiveness of antiarrhythmic and heart rate control medications as ordered  - Initiate emergency measures for life threatening arrhythmias  - Monitor electrolytes and administer replacement therapy as ordered  Outcome: Progressing

## 2021-09-16 NOTE — PAYOR COMM NOTE
--------------  DISCHARGE REVIEW    Payor: JERAD JOSEPH  Subscriber #:  [id number]  Authorization Number: [id number]    Admit date: 9/8/21  Admit time:   5:24 AM  Discharge Date: 9/14/2021  2:54 PM     Admitting Physician: Rita Barone MD  Attending Physici

## 2021-09-17 ENCOUNTER — TELEPHONE (OUTPATIENT)
Dept: CARDIOLOGY UNIT | Facility: HOSPITAL | Age: 66
End: 2021-09-17

## 2021-09-17 NOTE — PROGRESS NOTES
Follow Up Phone Call    1. How are you doing now that you are home Nausea and vomiting    2. Have there been any changes in your wound/incision since going home no       3. Is your pain manageable at home yes, unable to take anything d/t N & V    4. Are you following the walking routine given to you in the hospital limited d/t N & V       5. Are you continuing to use your incentive spirometer trying? 6. Do you have your appointments for Chest Xray 9/20                                                               Primary MD Johny Blizzard in 1 wkr                                                              Cardiologist BRENDA Porras 9/21 3:15                                                     Dr. Ai Hsu ? Cardiac Rehab to begin 1 mon after                                                                 seeing Dr Jolene Shaw , at [facility]     7. Do you have any other questions or concerns today  Not at this time.   Referred to Litzy with further ?'s      Ricardo Bill RN  9/17/2021  12:52 PM

## 2021-09-17 NOTE — H&P
RE: Shiraz Urbina  :  1955    Mrs. Evert Cardona is a very pleasant 57-year-old female patient who has been having clear-cut increasing angina.   In view of this, the patient has now undergone complete workup including a stress echo which was positive GI reflux, urgency or frequency of urination, or rashes. The patient has normal mobility. The patient denies any history of bleeding disorders.     Physical examination shows an alert female who has arrived for interview with her .   She is 5’5” ta

## 2021-09-22 ENCOUNTER — HOSPITAL ENCOUNTER (OUTPATIENT)
Dept: GENERAL RADIOLOGY | Facility: HOSPITAL | Age: 66
Discharge: HOME | End: 2021-09-22
Attending: THORACIC SURGERY (CARDIOTHORACIC VASCULAR SURGERY)
Payer: COMMERCIAL

## 2021-09-22 DIAGNOSIS — J90 PLEURAL EFFUSION: ICD-10-CM

## 2021-09-22 PROCEDURE — 71048 X-RAY EXAM CHEST 4+ VIEWS: CPT | Performed by: THORACIC SURGERY (CARDIOTHORACIC VASCULAR SURGERY)

## 2021-10-04 ENCOUNTER — HOSPITAL ENCOUNTER (OUTPATIENT)
Dept: GENERAL RADIOLOGY | Facility: HOSPITAL | Age: 66
Discharge: HOME | End: 2021-10-04
Attending: THORACIC SURGERY (CARDIOTHORACIC VASCULAR SURGERY)
Payer: COMMERCIAL

## 2021-10-04 DIAGNOSIS — J91.8 PLEURAL EFFUSION IN OTHER CONDITIONS CLASSIFIED ELSEWHERE: ICD-10-CM

## 2021-10-04 PROCEDURE — 71048 X-RAY EXAM CHEST 4+ VIEWS: CPT | Performed by: THORACIC SURGERY (CARDIOTHORACIC VASCULAR SURGERY)

## 2022-05-07 NOTE — DISCHARGE SUMMARY
[facility]  Discharge Summary    Amalia Francois Patient Status:  Inpatient    1955 MRN [id number]   [facility] 8NE-A Attending No att. providers found   Hosp Day # 6 PCP Maree Gosselin, MD     Admit date: 2021    Discharge Discharged

## (undated) DEVICE — Device

## (undated) DEVICE — HEMOCLIP HORIZON LG 004200

## (undated) DEVICE — TRAY ENDOVEIN KTV16 HARVESTING

## (undated) DEVICE — SOL LACT RINGERS 1000ML

## (undated) DEVICE — SUTURE PROLENE 7-0 CC

## (undated) DEVICE — SUTURE POLYDEK 2-0

## (undated) DEVICE — GOWN,SIRUS,FAB REINF,RAGLAN,XL,STERILE: Brand: MEDLINE

## (undated) DEVICE — SUTURE PROLENE 8-0 BV-130-5

## (undated) DEVICE — SOL  .9 1000ML BTL

## (undated) DEVICE — BLOWER CO2 MEDTRONIC/DLP 22150

## (undated) DEVICE — CATH THORACIC 36F 5 EYLT

## (undated) DEVICE — CATH DRAIN 36F HYDRAGLIDE XL

## (undated) DEVICE — STERILE POLYISOPRENE POWDER-FREE SURGICAL GLOVES: Brand: PROTEXIS

## (undated) DEVICE — SYRINGE 30ML LL TIP

## (undated) DEVICE — PDS II VLT 0 107CM AG ST3: Brand: ENDOLOOP

## (undated) DEVICE — OPEN HEART: Brand: MEDLINE INDUSTRIES, INC.

## (undated) DEVICE — CELL SAVER RESERVOIR

## (undated) DEVICE — ABSORBABLE HEMOSTAT (OXIDIZED REGENERATED CELLULOSE, U.S.P.): Brand: SURGICEL

## (undated) DEVICE — SYSTEM ZDRIVE NLTX DISPOSABLE

## (undated) DEVICE — SUTURE WIRE DOUBLE STERNOTOMY

## (undated) DEVICE — CELL SAVER BAG 600ML 4R2023

## (undated) DEVICE — SUTURE SILK 2-0

## (undated) NOTE — LETTER
Rose Mckeon M.D., F.A.C.S. Jarrett Dumont M.D., F.A.C.S. Nasima Calderon M.D., Evelia Lees. DENISHA Witt M.D., F.A.C.S. Ayo Moore. Leisa Villanueva M.D., F.A.C.S. CARLYN Montano M. Corrin Ax A.D. Travis Spare, M.D., F. concerns answered. If there are any issues which have not been adequately addressed, we ask you to bring them forward so that we can thoroughly address them.     A patient who is fully informed and understands their condition and options for treatment, as w _____    A [facility] surgeon as explained to me that if I should so desire, he/she is willing to explain my case and the surgical and non-surgical options to family members:             Yes _____ No _____    A [facility] surgeon has answered all of my questions regardin

## (undated) NOTE — LETTER
Patient Name: Harpal Kendall        : 1955       Medical Record #: [id number]    CONSENT FOR PROCEDURES/SEDATION    Date: 2021       Time: 10:03 AM        1.  I authorize the performance upon Harpal Kenadll the following:   Cardioversion  ___